# Patient Record
Sex: FEMALE | Race: WHITE | NOT HISPANIC OR LATINO | Employment: FULL TIME | URBAN - METROPOLITAN AREA
[De-identification: names, ages, dates, MRNs, and addresses within clinical notes are randomized per-mention and may not be internally consistent; named-entity substitution may affect disease eponyms.]

---

## 2023-03-11 PROBLEM — Z98.890 POST-OPERATIVE STATE: Status: ACTIVE | Noted: 2023-03-11

## 2023-03-11 PROBLEM — O99.891 BACK PAIN AFFECTING PREGNANCY IN THIRD TRIMESTER (HHS-HCC): Status: ACTIVE | Noted: 2023-03-11

## 2023-03-11 PROBLEM — N64.4 BREAST PAIN: Status: ACTIVE | Noted: 2023-03-11

## 2023-03-11 PROBLEM — T78.40XA ALLERGIC RASH PRESENT ON EXAMINATION: Status: ACTIVE | Noted: 2023-03-11

## 2023-03-11 PROBLEM — D22.9 MULTIPLE NEVI: Status: ACTIVE | Noted: 2023-03-11

## 2023-03-11 PROBLEM — O20.9 BLEEDING IN EARLY PREGNANCY (HHS-HCC): Status: ACTIVE | Noted: 2023-03-11

## 2023-03-11 PROBLEM — K13.79 MOUTH SORES: Status: ACTIVE | Noted: 2023-03-11

## 2023-03-11 PROBLEM — L25.9 DERMATITIS, CONTACT: Status: ACTIVE | Noted: 2023-03-11

## 2023-03-11 PROBLEM — R53.83 FATIGUE: Status: ACTIVE | Noted: 2023-03-11

## 2023-03-11 PROBLEM — M54.50 LOW BACK PAIN: Status: ACTIVE | Noted: 2023-03-11

## 2023-03-11 PROBLEM — R23.3 SPONTANEOUS ECCHYMOSIS: Status: ACTIVE | Noted: 2023-03-11

## 2023-03-11 PROBLEM — M25.552 LEFT HIP PAIN: Status: ACTIVE | Noted: 2023-03-11

## 2023-03-11 PROBLEM — O34.219 DELIVERED BY CESAREAN DELIVERY FOLLOWING PREVIOUS CESAREAN DELIVERY (HHS-HCC): Status: ACTIVE | Noted: 2023-03-11

## 2023-03-11 PROBLEM — E87.8 ELECTROLYTE ABNORMALITY: Status: ACTIVE | Noted: 2023-03-11

## 2023-03-11 PROBLEM — R50.9 FEVER, INTERMITTENT: Status: ACTIVE | Noted: 2023-03-11

## 2023-03-11 PROBLEM — H02.9 EYELID ABNORMALITY: Status: ACTIVE | Noted: 2023-03-11

## 2023-03-11 PROBLEM — N91.2 AMENORRHEA: Status: ACTIVE | Noted: 2023-03-11

## 2023-03-11 PROBLEM — R74.01 ELEVATED ALT MEASUREMENT: Status: ACTIVE | Noted: 2023-03-11

## 2023-03-11 PROBLEM — R05.9 COUGH: Status: ACTIVE | Noted: 2023-03-11

## 2023-03-11 PROBLEM — R63.5 WEIGHT GAIN: Status: ACTIVE | Noted: 2023-03-11

## 2023-03-11 PROBLEM — D84.9: Status: ACTIVE | Noted: 2023-03-11

## 2023-03-11 PROBLEM — Q65.89 ACETABULAR DYSPLASIA (HHS-HCC): Status: ACTIVE | Noted: 2023-03-11

## 2023-03-11 PROBLEM — M54.9 BACK PAIN AFFECTING PREGNANCY IN THIRD TRIMESTER (HHS-HCC): Status: ACTIVE | Noted: 2023-03-11

## 2023-03-11 RX ORDER — BENZONATATE 100 MG/1
1 CAPSULE ORAL 3 TIMES DAILY
COMMUNITY
Start: 2022-07-22 | End: 2023-03-31

## 2023-03-11 RX ORDER — MELOXICAM 15 MG/1
1 TABLET ORAL DAILY
COMMUNITY
Start: 2022-10-10 | End: 2023-03-31 | Stop reason: ALTCHOICE

## 2023-03-15 ENCOUNTER — APPOINTMENT (OUTPATIENT)
Dept: PRIMARY CARE | Facility: CLINIC | Age: 38
End: 2023-03-15
Payer: COMMERCIAL

## 2023-03-16 ENCOUNTER — APPOINTMENT (OUTPATIENT)
Dept: PRIMARY CARE | Facility: CLINIC | Age: 38
End: 2023-03-16
Payer: COMMERCIAL

## 2023-03-31 ENCOUNTER — TELEPHONE (OUTPATIENT)
Dept: PRIMARY CARE | Facility: CLINIC | Age: 38
End: 2023-03-31

## 2023-03-31 ENCOUNTER — OFFICE VISIT (OUTPATIENT)
Dept: PRIMARY CARE | Facility: CLINIC | Age: 38
End: 2023-03-31
Payer: COMMERCIAL

## 2023-03-31 VITALS
RESPIRATION RATE: 12 BRPM | DIASTOLIC BLOOD PRESSURE: 79 MMHG | WEIGHT: 192 LBS | TEMPERATURE: 97.9 F | BODY MASS INDEX: 34.02 KG/M2 | OXYGEN SATURATION: 98 % | SYSTOLIC BLOOD PRESSURE: 122 MMHG | HEART RATE: 86 BPM | HEIGHT: 63 IN

## 2023-03-31 DIAGNOSIS — R05.3 CHRONIC COUGH: Primary | ICD-10-CM

## 2023-03-31 DIAGNOSIS — R05.3 PERSISTENT COUGH: Primary | ICD-10-CM

## 2023-03-31 DIAGNOSIS — R22.1 LUMP IN NECK: ICD-10-CM

## 2023-03-31 PROCEDURE — 1036F TOBACCO NON-USER: CPT | Performed by: FAMILY MEDICINE

## 2023-03-31 PROCEDURE — 99214 OFFICE O/P EST MOD 30 MIN: CPT | Performed by: FAMILY MEDICINE

## 2023-03-31 RX ORDER — METHYLPREDNISOLONE 4 MG/1
TABLET ORAL
Qty: 21 TABLET | Refills: 0 | Status: SHIPPED | OUTPATIENT
Start: 2023-03-31 | End: 2023-04-07

## 2023-03-31 RX ORDER — ASCORBIC ACID 500 MG
500 TABLET ORAL DAILY
COMMUNITY

## 2023-03-31 RX ORDER — FLUTICASONE PROPIONATE 220 UG/1
2 AEROSOL, METERED RESPIRATORY (INHALATION)
Qty: 12 G | Refills: 1 | Status: SHIPPED | OUTPATIENT
Start: 2023-03-31 | End: 2023-04-26 | Stop reason: ALTCHOICE

## 2023-03-31 RX ORDER — BUDESONIDE AND FORMOTEROL FUMARATE DIHYDRATE 160; 4.5 UG/1; UG/1
2 AEROSOL RESPIRATORY (INHALATION)
Qty: 1 EACH | Refills: 1 | Status: SHIPPED | OUTPATIENT
Start: 2023-03-31 | End: 2023-04-26 | Stop reason: SDUPTHER

## 2023-03-31 NOTE — PROGRESS NOTES
"Subjective   Patient ID: Raegan Ozuna is a 37 y.o. female who presents for Cough.    HPI   She has had a persistent cough for 5 weeks. She went to urgent care in the beginning with a sore throat and cough. She tested negative for COVID and Strep. She went back on 03/09 she was given a prednisone taper pack which provided relief while she was on it. The cough returned when the medication ended. She denies fever, congestion,and mucus. She feels well other than the cough. She tried tessalon Perles, cough syrup, cold and flu medications, tea, and humidifier. She has a history of sports induced asthma. She has not needed inhaler since middle school or high school. She only takes OTC vitamins.     She notice a lump on her thyroid 4 weeks ago.  She had two maternal cousins with thyroid cancer. l    Review of Systems    Objective   /79   Pulse 86   Temp 36.6 °C (97.9 °F)   Resp 12   Ht 1.6 m (5' 3\")   Wt 87.1 kg (192 lb)   LMP 03/14/2023   SpO2 98%   BMI 34.01 kg/m²     Physical Exam  Constitutional:       Appearance: Normal appearance.   Neck:      Comments: Pea size nodule R and inferior to thyroid  question lymph nodes  Cardiovascular:      Rate and Rhythm: Normal rate and regular rhythm.      Pulses: Normal pulses.      Heart sounds: Normal heart sounds.   Pulmonary:      Effort: Pulmonary effort is normal.      Breath sounds: Normal breath sounds.   Abdominal:      General: Bowel sounds are normal.      Palpations: Abdomen is soft.   Musculoskeletal:         General: No tenderness. Normal range of motion.      Cervical back: Normal range of motion and neck supple.   Skin:     General: Skin is warm and dry.   Neurological:      General: No focal deficit present.      Mental Status: She is alert and oriented to person, place, and time.   Psychiatric:         Mood and Affect: Mood normal.         Thought Content: Thought content normal.         Judgment: Judgment normal.     Assessment/Plan   Problem List " Items Addressed This Visit          Respiratory    Persistent cough - Primary    Relevant Medications    methylPREDNISolone (Medrol Dospak) 4 mg tablets    budesonide-formoteroL (Symbicort) 160-4.5 mcg/actuation inhaler    Other Relevant Orders    XR chest 2 views (Completed)       Other    Lump in neck       Inflammation induced Asthma  Provided order for Chest X ray. Will call With results.   Start on Symbicort steroid inhaler two puffs in the morning, two puffs in the night, for a month. Rinse mouth out after use. Prescription sent to pharmacy.   May use Albuterol inhaler as needed during coughing episodes.   Start on Medrol Dose pack with food daily. Prescription sent to pharmacy.     Mass Inferior and to the right of Thyroid  Follow up in 3 weeks when inflammation is reduced to reassess.     Follow up in 3 weeks.      Scribe Attestation  By signing my name below, ICherry Scribe   attest that this documentation has been prepared under the direction and in the presence of Cat Reddy DO.

## 2023-03-31 NOTE — PATIENT INSTRUCTIONS
Inflammation induced Asthma  Provided order for Chest X ray. Will call With results.   Start on Symbicort steroid inhaler two puffs in the morning, two puffs in the night, for a month. Rinse mouth out after use. Prescription sent to pharmacy.   May use Albuterol inhaler as needed during coughing episodes.   Start on Medrol Dose pack with food daily. Prescription sent to pharmacy.     Mass Inferior and to the right of Thyroid  Follow up in 3 weeks when inflammation is reduced to reassess.

## 2023-03-31 NOTE — TELEPHONE ENCOUNTER
Pt would like an alternate inhaler due to cost of the one prescribed      Please let her know I sent in generic flovent for her to try  AB

## 2023-04-02 PROBLEM — R22.1 LUMP IN NECK: Status: ACTIVE | Noted: 2023-04-02

## 2023-04-02 PROBLEM — R05.3 PERSISTENT COUGH: Status: ACTIVE | Noted: 2023-04-02

## 2023-04-03 DIAGNOSIS — J18.9 PNEUMONIA DUE TO INFECTIOUS ORGANISM, UNSPECIFIED LATERALITY, UNSPECIFIED PART OF LUNG: Primary | ICD-10-CM

## 2023-04-03 RX ORDER — AZITHROMYCIN 500 MG/1
500 TABLET, FILM COATED ORAL DAILY
Qty: 5 TABLET | Refills: 0 | Status: SHIPPED | OUTPATIENT
Start: 2023-04-03 | End: 2023-04-08

## 2023-04-03 NOTE — PROGRESS NOTES
Pt chest xray shows possible pneumonia.  Will treat with zithromax 500 mg for 5 days.  See back 3 weeks or prn sooner.  Will need repeat cxr or ct if not cleared with treatment

## 2023-04-03 NOTE — TELEPHONE ENCOUNTER
Pt would like to know if you could order her ct that was suggested      I sent message to pt that I would like to treat her for pneumonia and sent in antibiotic and then may repeat cxr and/or do ct chest at that time.  Please make sure she got this message  AB

## 2023-04-26 ENCOUNTER — OFFICE VISIT (OUTPATIENT)
Dept: PRIMARY CARE | Facility: CLINIC | Age: 38
End: 2023-04-26
Payer: COMMERCIAL

## 2023-04-26 ENCOUNTER — LAB (OUTPATIENT)
Dept: LAB | Facility: LAB | Age: 38
End: 2023-04-26
Payer: COMMERCIAL

## 2023-04-26 VITALS
RESPIRATION RATE: 18 BRPM | BODY MASS INDEX: 33.12 KG/M2 | WEIGHT: 194 LBS | SYSTOLIC BLOOD PRESSURE: 128 MMHG | OXYGEN SATURATION: 98 % | HEIGHT: 64 IN | DIASTOLIC BLOOD PRESSURE: 76 MMHG | TEMPERATURE: 98.1 F | HEART RATE: 86 BPM

## 2023-04-26 DIAGNOSIS — F41.9 ANXIETY: ICD-10-CM

## 2023-04-26 DIAGNOSIS — E07.9 LUMP IN THYROID: Primary | ICD-10-CM

## 2023-04-26 DIAGNOSIS — J18.9 PNEUMONIA OF LEFT LUNG DUE TO INFECTIOUS ORGANISM, UNSPECIFIED PART OF LUNG: ICD-10-CM

## 2023-04-26 LAB — THYROTROPIN (MIU/L) IN SER/PLAS BY DETECTION LIMIT <= 0.05 MIU/L: 1.55 MIU/L (ref 0.44–3.98)

## 2023-04-26 PROCEDURE — 99214 OFFICE O/P EST MOD 30 MIN: CPT | Performed by: FAMILY MEDICINE

## 2023-04-26 PROCEDURE — 84443 ASSAY THYROID STIM HORMONE: CPT

## 2023-04-26 PROCEDURE — 1036F TOBACCO NON-USER: CPT | Performed by: FAMILY MEDICINE

## 2023-04-26 PROCEDURE — 36415 COLL VENOUS BLD VENIPUNCTURE: CPT

## 2023-04-26 RX ORDER — ALBUTEROL SULFATE 90 UG/1
AEROSOL, METERED RESPIRATORY (INHALATION)
COMMUNITY
Start: 2023-03-09 | End: 2023-04-26 | Stop reason: ALTCHOICE

## 2023-04-26 RX ORDER — ACETAMINOPHEN 325 MG/1
TABLET ORAL
COMMUNITY
Start: 2022-07-21 | End: 2023-04-26 | Stop reason: ALTCHOICE

## 2023-04-26 RX ORDER — PREDNISONE 20 MG/1
TABLET ORAL
COMMUNITY
Start: 2023-03-09 | End: 2023-04-26 | Stop reason: ALTCHOICE

## 2023-04-26 RX ORDER — GENTAMICIN SULFATE 3 MG/ML
SOLUTION/ DROPS OPHTHALMIC
COMMUNITY
Start: 2023-03-17 | End: 2023-04-26 | Stop reason: ALTCHOICE

## 2023-04-26 ASSESSMENT — PATIENT HEALTH QUESTIONNAIRE - PHQ9
2. FEELING DOWN, DEPRESSED OR HOPELESS: NOT AT ALL
SUM OF ALL RESPONSES TO PHQ9 QUESTIONS 1 AND 2: 0
1. LITTLE INTEREST OR PLEASURE IN DOING THINGS: NOT AT ALL

## 2023-04-26 ASSESSMENT — PAIN SCALES - GENERAL: PAINLEVEL: 0-NO PAIN

## 2023-04-26 NOTE — PROGRESS NOTES
"Subjective   Patient ID: Raegan Ozuna is a 37 y.o. female who presents for f/up from sick visit.    HPI   She reports she is pregnant. Her LMP started 03/14/2023. She has an OB appointment on 05/01/2023. Dr Tilley     She notes that her cough is gone. She previously had a chest x ray that showed developing pneumonia. She is recommended to have a repeat X Ray. Will do in 6-8 weeks      She is concerned about her Thyroid, as she feels a lump on her right side. She denies pain or difficulty swallowing. She notes that she voiced this at last visit, but waited until cough and URI were resolved and it is unchanged. She has had imaging of her neck done in the past due to swollen lymphnodes 7/2022 when had covid. She has not  had imaging of her thyroid. She reports she had two maternal cousins who had thyroid cancer at ages 28 and 31. She denies family history of hypothyroidism.     She inquired about medication for anxiety that is safe for pregnancy. She had been on medication in the past, and felt palpitations, but she attributes this to a health drink she was using at the time and stayed on lexapro less than 30 days.  She notes the anxiety has been present since she had her second son and she has just been dealing with it but feels like she needs to start something.  Will discuss with OB at May 1st appt and then may start med, discussed starting zoloft    Review of Systems    Objective   /76   Pulse 86   Temp 36.7 °C (98.1 °F)   Resp 18   Ht 1.613 m (5' 3.5\")   Wt 88 kg (194 lb)   LMP 03/14/2023   SpO2 98%   BMI 33.83 kg/m²     Physical Exam  Constitutional:       Appearance: Normal appearance.   Neck:      Comments: Question lump lower R thyroid.  Nontender, not discreet  Cardiovascular:      Rate and Rhythm: Normal rate and regular rhythm.      Pulses: Normal pulses.      Heart sounds: Normal heart sounds.   Pulmonary:      Effort: Pulmonary effort is normal.      Breath sounds: Normal breath sounds. "   Musculoskeletal:         General: No tenderness. Normal range of motion.      Cervical back: Normal range of motion and neck supple.   Skin:     General: Skin is warm and dry.   Neurological:      Mental Status: She is alert and oriented to person, place, and time.   Psychiatric:         Mood and Affect: Mood normal.         Thought Content: Thought content normal.         Judgment: Judgment normal.         Assessment/Plan   Problem List Items Addressed This Visit    None  Visit Diagnoses       Lump in thyroid    -  Primary    Relevant Orders    US thyroid    Pneumonia of left lung due to infectious organism, unspecified part of lung        Relevant Orders    XR chest 2 views    Anxiety        Relevant Orders    TSH with reflex to Free T4 if abnormal          Thyroid mass on right side  Provided order for Ultrasound of the Thyroid. Will call with results.     Inflammation induced Asthma Follow up  Will repeat Chest X Ray at end of may, once in the second trimester.   Provided order. Will call with results.     Anxiety   Advised to discuss with OB/GYN   Provided order for repeat TSH levels. Will call with results.  May try Zoloft if ok w OB, pt will call and let me know, discussed starting med and side effect profile and then will see her back in 3-4 weeks if start medication    Follow up in 1 month after OB appointment.     Scribe Attestation  By signing my name below, ICherry , Scribe   attest that this documentation has been prepared under the direction and in the presence of Cat Reddy DO.

## 2023-04-26 NOTE — PATIENT INSTRUCTIONS
Thyroid mass on right side  Provided order for Ultrasound of the Thyroid. Will call with results.     Inflammation induced Asthma Follow up  Will repeat Chest X Ray at end of may, once in the second trimester.   Provided order. Will call with results.     Anxiety   Advised to discuss with OB/GYN   Provided order for repeat TSH levels. Will call with results.

## 2023-05-02 LAB
CHLAMYDIA TRACH., AMPLIFIED: NEGATIVE
N. GONORRHEA, AMPLIFIED: NEGATIVE
URINE CULTURE: NORMAL

## 2023-05-03 ENCOUNTER — APPOINTMENT (OUTPATIENT)
Dept: LAB | Facility: LAB | Age: 38
End: 2023-05-03
Payer: COMMERCIAL

## 2023-05-03 LAB
ABO GROUP (TYPE) IN BLOOD: NORMAL
ANTIBODY SCREEN: NORMAL
CALCIDIOL (25 OH VITAMIN D3) (NG/ML) IN SER/PLAS: 23 NG/ML
ERYTHROCYTE DISTRIBUTION WIDTH (RATIO) BY AUTOMATED COUNT: 12.4 % (ref 11.5–14.5)
ERYTHROCYTE MEAN CORPUSCULAR HEMOGLOBIN CONCENTRATION (G/DL) BY AUTOMATED: 33.2 G/DL (ref 32–36)
ERYTHROCYTE MEAN CORPUSCULAR VOLUME (FL) BY AUTOMATED COUNT: 87 FL (ref 80–100)
ERYTHROCYTES (10*6/UL) IN BLOOD BY AUTOMATED COUNT: 4.54 X10E12/L (ref 4–5.2)
HEMATOCRIT (%) IN BLOOD BY AUTOMATED COUNT: 39.7 % (ref 36–46)
HEMOGLOBIN (G/DL) IN BLOOD: 13.2 G/DL (ref 12–16)
HEPATITIS B VIRUS SURFACE AG PRESENCE IN SERUM: NONREACTIVE
HEPATITIS C VIRUS AB PRESENCE IN SERUM: NONREACTIVE
HIV 1/ 2 AG/AB SCREEN: NONREACTIVE
LEUKOCYTES (10*3/UL) IN BLOOD BY AUTOMATED COUNT: 9.2 X10E9/L (ref 4.4–11.3)
NRBC (PER 100 WBCS) BY AUTOMATED COUNT: 0 /100 WBC (ref 0–0)
PLATELETS (10*3/UL) IN BLOOD AUTOMATED COUNT: 235 X10E9/L (ref 150–450)
REFLEX ADDED, ANEMIA PANEL: NORMAL
RH FACTOR: NORMAL
RUBELLA VIRUS IGG AB: NORMAL
SYPHILIS TOTAL AB: NONREACTIVE

## 2023-05-04 LAB
HEMOGLOBIN A2: 2.4 %
HEMOGLOBIN A: 96.8 %
HEMOGLOBIN F: 0.8 %
HEMOGLOBIN IDENTIFICATION INTERPRETATION: NORMAL
PATH REVIEW-HGB IDENTIFICATION: NORMAL

## 2023-06-05 ASSESSMENT — EDINBURGH POSTNATAL DEPRESSION SCALE (EPDS)
I HAVE BEEN ANXIOUS OR WORRIED FOR NO GOOD REASON: HARDLY EVER
I HAVE FELT SCARED OR PANICKY FOR NO GOOD REASON: NO, NOT AT ALL
I HAVE LOOKED FORWARD WITH ENJOYMENT TO THINGS: AS MUCH AS I EVER DID
I HAVE BLAMED MYSELF UNNECESSARILY WHEN THINGS WENT WRONG: NOT VERY OFTEN
THINGS HAVE BEEN GETTING ON TOP OF ME: NO, MOST OF THE TIME I HAVE COPED QUITE WELL
THE THOUGHT OF HARMING MYSELF HAS OCCURRED TO ME: NEVER
I HAVE FELT SAD OR MISERABLE: NO, NOT AT ALL
I HAVE BEEN SO UNHAPPY THAT I HAVE BEEN CRYING: NO, NEVER
I HAVE BEEN ABLE TO LAUGH AND SEE THE FUNNY SIDE OF THINGS: AS MUCH AS I ALWAYS COULD
TOTAL SCORE: 3
I HAVE BEEN SO UNHAPPY THAT I HAVE HAD DIFFICULTY SLEEPING: NOT AT ALL

## 2023-06-07 ENCOUNTER — APPOINTMENT (OUTPATIENT)
Dept: LAB | Facility: LAB | Age: 38
End: 2023-06-07
Payer: COMMERCIAL

## 2023-07-05 LAB — URINE CULTURE: ABNORMAL

## 2023-07-26 ENCOUNTER — PATIENT OUTREACH (OUTPATIENT)
Dept: CARE COORDINATION | Facility: CLINIC | Age: 38
End: 2023-07-26
Payer: COMMERCIAL

## 2023-08-03 ENCOUNTER — DOCUMENTATION (OUTPATIENT)
Dept: CARE COORDINATION | Facility: CLINIC | Age: 38
End: 2023-08-03
Payer: COMMERCIAL

## 2023-09-26 ENCOUNTER — LAB (OUTPATIENT)
Dept: LAB | Facility: LAB | Age: 38
End: 2023-09-26
Payer: COMMERCIAL

## 2023-09-26 LAB
ERYTHROCYTE DISTRIBUTION WIDTH (RATIO) BY AUTOMATED COUNT: 13.2 % (ref 11.5–14.5)
ERYTHROCYTE MEAN CORPUSCULAR HEMOGLOBIN CONCENTRATION (G/DL) BY AUTOMATED: 31.5 G/DL (ref 32–36)
ERYTHROCYTE MEAN CORPUSCULAR VOLUME (FL) BY AUTOMATED COUNT: 92 FL (ref 80–100)
ERYTHROCYTES (10*6/UL) IN BLOOD BY AUTOMATED COUNT: 4 X10E12/L (ref 4–5.2)
GLUCOSE, 1 HR SCREEN, PREG: 138 MG/DL
HEMATOCRIT (%) IN BLOOD BY AUTOMATED COUNT: 36.8 % (ref 36–46)
HEMOGLOBIN (G/DL) IN BLOOD: 11.6 G/DL (ref 12–16)
LEUKOCYTES (10*3/UL) IN BLOOD BY AUTOMATED COUNT: 9.3 X10E9/L (ref 4.4–11.3)
NRBC (PER 100 WBCS) BY AUTOMATED COUNT: 0 /100 WBC (ref 0–0)
PLATELETS (10*3/UL) IN BLOOD AUTOMATED COUNT: 229 X10E9/L (ref 150–450)
REFLEX ADDED, ANEMIA PANEL: ABNORMAL
SYPHILIS TOTAL AB: NONREACTIVE

## 2023-10-02 ENCOUNTER — ROUTINE PRENATAL (OUTPATIENT)
Dept: OBSTETRICS AND GYNECOLOGY | Facility: CLINIC | Age: 38
End: 2023-10-02
Payer: COMMERCIAL

## 2023-10-02 VITALS — WEIGHT: 217 LBS | BODY MASS INDEX: 38.44 KG/M2 | DIASTOLIC BLOOD PRESSURE: 72 MMHG | SYSTOLIC BLOOD PRESSURE: 118 MMHG

## 2023-10-02 DIAGNOSIS — O24.419 GESTATIONAL DIABETES MELLITUS (GDM) IN THIRD TRIMESTER, GESTATIONAL DIABETES METHOD OF CONTROL UNSPECIFIED (HHS-HCC): Primary | ICD-10-CM

## 2023-10-02 DIAGNOSIS — O09.33 INSUFFICIENT PRENATAL CARE IN THIRD TRIMESTER (HHS-HCC): ICD-10-CM

## 2023-10-02 LAB
POC GLUCOSE, URINE: NEGATIVE MG/DL
POC PROTEIN, URINE: NEGATIVE MG/DL

## 2023-10-02 PROCEDURE — 99213 OFFICE O/P EST LOW 20 MIN: CPT | Performed by: OBSTETRICS & GYNECOLOGY

## 2023-10-02 PROCEDURE — 81003 URINALYSIS AUTO W/O SCOPE: CPT | Mod: QW | Performed by: OBSTETRICS & GYNECOLOGY

## 2023-10-02 PROCEDURE — 59425 ANTEPARTUM CARE ONLY: CPT | Performed by: OBSTETRICS & GYNECOLOGY

## 2023-10-02 NOTE — PROGRESS NOTES
"Subjective   Patient ID [id number]   Raegan Ozuna is a 37 y.o.  at 28w6d with a working estimated date of delivery of 2023, by Last Menstrual Period who presents for a routine prenatal visit. She denies vaginal bleeding, leakage of fluid, decreased fetal movements, or contractions.    Her pregnancy is complicated by:  Previous  section x2      Objective   Physical Exam:   Weight: 98.4 kg (217 lb)  Expected Total Weight Gain: Could not be calculated   Pregravid BMI: Could not be calculated  BP: 118/72         Size greater than dates at 33 cm.  Good fetal movement.    Prenatal Labs  Urine Dip:  Lab Results   Component Value Date    KETONESU NEGATIVE 2023     Lab Results   Component Value Date    HGB 11.6 (L) 2023    HCT 36.8 2023    HEPBSAG NONREACTIVE 2023     No results found for: \"PAPPA\", \"AFP\", \"HCG\", \"ESTRIOL\", \"INHBA\"  No results found for: \"GLUF\", \"GLUT1\", \"BRBCGJW4RB\", \"DLIASDP3ZX\"    Imaging    Assessment/Plan     Continue prenatal vitamin.  Labs reviewed.  Expected mode of delivery plans for repeat  section  Follow up in 2 week for a routine prenatal visit.  "

## 2023-10-05 ENCOUNTER — LAB (OUTPATIENT)
Dept: LAB | Facility: LAB | Age: 38
End: 2023-10-05
Payer: COMMERCIAL

## 2023-10-05 DIAGNOSIS — R73.9 HYPERGLYCEMIA, UNSPECIFIED: Primary | ICD-10-CM

## 2023-10-05 LAB
GLUCOSE 1H P 100 G GLC PO SERPL-MCNC: 192 MG/DL
GLUCOSE 2H P 100 G GLC PO SERPL-MCNC: 161 MG/DL
GLUCOSE 3H P 100 G GLC PO SERPL-MCNC: 99 MG/DL
GLUCOSE P FAST SERPL-MCNC: 80 MG/DL

## 2023-10-05 PROCEDURE — 36415 COLL VENOUS BLD VENIPUNCTURE: CPT

## 2023-10-05 PROCEDURE — 82952 GTT-ADDED SAMPLES: CPT

## 2023-10-05 PROCEDURE — 82950 GLUCOSE TEST: CPT

## 2023-10-05 PROCEDURE — 82947 ASSAY GLUCOSE BLOOD QUANT: CPT

## 2023-10-06 DIAGNOSIS — O24.410 DIET CONTROLLED GESTATIONAL DIABETES MELLITUS (GDM) IN THIRD TRIMESTER (HHS-HCC): Primary | ICD-10-CM

## 2023-10-06 RX ORDER — LANCETS
EACH MISCELLANEOUS
Qty: 100 EACH | Refills: 3 | Status: SHIPPED | OUTPATIENT
Start: 2023-10-06

## 2023-10-06 RX ORDER — DEXTROSE 4 G
TABLET,CHEWABLE ORAL
Qty: 1 EACH | Refills: 0 | Status: SHIPPED | OUTPATIENT
Start: 2023-10-06

## 2023-10-11 ENCOUNTER — EDUCATION (OUTPATIENT)
Dept: MATERNAL FETAL MEDICINE | Facility: CLINIC | Age: 38
End: 2023-10-11
Payer: COMMERCIAL

## 2023-10-12 NOTE — PROGRESS NOTES
Gestational Diabetes Self-Management VIRTUAL Group Education provided today,     Overview of Diabetes    Type 1    Type 2    Gestational       -Risks to baby and Mom  Self-monitoring     Tips for Testing/troubleshooting glucometers    Goal range for blood sugars (<95 fasting, <140 1 hour postprandial for all meals)    Record Keeping    Blood Sugar Line    Blood Sugar Log Sheets - provided  Managing Diabetes     Physical Activity - recommendation is about 150 minutes per week    Medication        Oral/insulin overview  Additional  testing     NST/BPP/Growth ultrasound - general overview  Postpartum     Expectations in the hospital    Follow up - recommend fasting, 75g OGGT, 6-8 weeks after delivery     50% change of developing GDM in another pregnancy    50% chance of developing T2DM within next 10 years    Up to 30% of patients will have pre-diabetes or T2DM following delivery       Breastfeeding is strongly encouraged   Special considerations, self-management    Hypoglycemia    Hyperglycemia    Sick Day Rules  Resilience training/stress management    Engage your senses    Gratitude practice  Emotional support     “Baby Blues”    Postpartum Depression       When to call for help  Nutrition planning     Identify carbohydrates, serving size, carbohydrate counting    “Free Foods” - very low carbohydrate options    Label Reading    Personalized Meal Plan     3 meals + 3 snacks = approximately 175g carbohydrates/day    Follow up for 1:1 Registered Dietician consult       [phone] to schedule appointment    Individual consult with Maternal Fetal Medicine provider is scheduled.

## 2023-10-15 PROBLEM — O24.419 GESTATIONAL DIABETES MELLITUS (GDM) IN THIRD TRIMESTER (HHS-HCC): Status: ACTIVE | Noted: 2023-10-15

## 2023-10-15 PROBLEM — N85.A UTERINE SCAR FROM PREVIOUS CESAREAN DELIVERY: Status: ACTIVE | Noted: 2023-10-15

## 2023-10-15 PROBLEM — N91.2 AMENORRHEA: Status: RESOLVED | Noted: 2023-03-11 | Resolved: 2023-10-15

## 2023-10-15 PROBLEM — R05.9 COUGH: Status: RESOLVED | Noted: 2023-03-11 | Resolved: 2023-10-15

## 2023-10-15 PROBLEM — O99.210 OBESITY IN PREGNANCY (HHS-HCC): Status: ACTIVE | Noted: 2023-10-15

## 2023-10-15 NOTE — PROGRESS NOTES
Raegan Ozuna is a 37 y.o. here for MFM consultation at 30w5d for GDM, referred by Dr. Tilley    Overall pt reports, she is feeling well. +FM. Denies VB, LOF, or CTXs.     Bootcamp last week. Checking BG 4x daily. BG log reviewed- more than 80% within goal range.      issues:  GDM  Obesity     ObHx-, TAB , then miscarriage followed by ectopic pregnancy ) delivered via  x2, this pregnancy complicated by sepsis and complicated UTI  GynHx-regular periods, last PAP , remote h/x abnormal PAP () and had colpo  MedHx-? Autoimmune disorder- has not seen immunologist in a while   SurgHx-tonsilectomy, L elbow, c/s x2 as above   FamHx-denies pertinent h/x  SocHx-denies ETOH, tobacco, or illicit drugs  All-NKDA  Meds-Macrobid daily, PNV    Gen-NAD  Cardiac- good peripheral perfusion  Respiratory- non-labored breathing  Abdomen- soft, non-tender, gravid   Extremities- symmetrical   Pelvic- deferred      Visit Vitals  /70   Wt 96.6 kg (213 lb)   LMP 2023   BMI 37.73 kg/m²   OB Status Pregnant   Smoking Status Never   BSA 2.07 m²        Sono-EFW 96% , AC >99%     Problem List Items Addressed This Visit          Pregnancy    Gestational diabetes mellitus (GDM) in third trimester    Overview     -Shared Care with Dr. Tilley  -1hr-138 (), 3hr (10/5)- fasting 80, 1hr 192, 2hr 161, 3hr 99  - Attended [place]  - M Consult completed 10/16  -Weekly communication with blood sugar line  -MFM 36 wk visit  -Serial growth ultrasounds starting at 28 weeks    Last ultrasound: EFW 96%, AC >99%, BPP 8/8, normal MANUEL    Fetal surveillance if on medications, LGA, poly, or uncontrolled/level of control unknown:  -Weekly at 32 weeks  -Twice weekly at 36 weeks    Current Regimen: nutrition    Delivery Plan: pending 36 week follow up visit  Intrapartum:  GDM protocol  Postpartum: No medication    Recommend PP 2hr gtt and q3yr F/U with PCP for A1C and TSH           Current Assessment &  Plan     Patient was recently diagnosed with gestational diabetes (GDM).  We discussed the pregnancy implications of the diagnosis including increased risk of pre-eclampsia, LGA/macrosomia, shoulder dystocia, stillbirth as well as  hypoglycemia, hyperbilirubinemia and respiratory distress.  We reviewed the importance of glycemic control and its impact on lowering these risks.  Discussed management ranging from dietary changes to pharmacotherapy and that insulin is considered first line therapy rather than oral agents if medication is ultimately needed.  Discussed our recommendation for serial growth ultrasounds and starting  testing at 32 weeks if treatment is required.  We also stressed the potential persistence of impaired glucose tolerance post pregnancy in up to 30% of patients and 50% risk of IGT/T2DM in the next 10 years with an overall lifetime risk of T2DM of 70%. We reviewed the recommendation for postpartum screening at 6 weeks post-partum (can be performed as soon as 2 days after delivery) and, if normal, routine screening every 1-3 years. We did discuss that a healthy diet and maintenance of a normal body weight may reduce those risks    In summary the following is recommended:    1. We will continue to co-manage diabetes via the Bloodsugar line with weekly assessment of glycemic control.  Current regimen is: nutrition plan, reviewed BG log in office, one outlier for fasting levels. Advised to send weekly to blood sugar line on   2. We will plan for a follow up [facility] visit at 36 weeks to assess overall control and provide delivery timing recommendations.  3. Recommend serial growth ultrasounds every 4 weeks starting at 28 weeks gestation.  4. Weekly  testing is recommended starting at 32 weeks IF medication is required OR there is a LGA growth pattern.  Twice weekly testing is recommended at 32 weeks if control is suboptimal, there is polyhydramnios, or medication is  required AND there is a LGA growth pattern.  5. Delivery is recommended at 39 weeks, though if glycemic control is suboptimal then delivery at 37-39 weeks may be considered.  6. If the EFW is >4500g at the time of delivery  should be considered.  7. A 2hr GTT is recommended 6 weeks postpartum (can be performed as soon as 2 days postpartum), if normal then screening for T2DM is recommended at least every 3 years.           Obesity in pregnancy - Primary    Overview     -Today we discussed the pregnancy implications including increased risk of pre-eclampsia, gestational diabetes,  delivery, LGA/macrosomia, growth restriction, stillbirth, shoulder dystocia, venous thromboembolic disease and congenital anomalies.    -We reviewed that modest weight loss of even 5% of total body weight is associated with a decrease in these risks.  We also discussed that preconception BMI appears to more predictive of adverse pregnancy outcome than gestational weight gain and that it is unclear what is optimal weight gain in pregnancy in women with obesity, though weight loss is discouraged during pregnancy.    -We reviewed the [place] of Medicine recommendations for gestational weight gain obese women, though subsequent large studies have suggestive that lower weight gain may be associated with lower rates of maternal and fetal morbidity and that we recommend that she limit her weight gain to no more than 11-20lbs during pregnancy and that weight gain less than 11-20 lbs is also acceptable assuming normal fetal growth.    - testing weekly at 37 wks if not initiated sooner   -See GDM dx              Uterine scar from previous  delivery    Overview     -CD x2  -Plans rCD   -Deciding on delivery [facility], [place] [facility]  -Delivery planning visit for GDM at or around 36 wks         Urinary tract infection in mother during third trimester of pregnancy    Overview     -Hospitalized in early  pregnancy and treated for sepsis with readmission a few days later as symptoms returned, GBS + urine, ? Pyelonephritis  -On ppx macrobid daily now  -Treat for GBS + at time of delivery          Supervision of high risk pregnancy in third trimester    Overview     -Shared care with Dr. Tilley, but considering ROXANNA   -Oriented to practice and care model in case she decides to transfer.   -Plan to RTC in 2 wks if ROXANNA or at 36 wks if she continues in shared care model  -Weekly communication with blood sugar line  -Repeat growth x3 wks given LGA status            Excessive fetal growth affecting management of pregnancy in third trimester    Overview     -Reviewed that a fetus 4000 to 4500g is considered macrosomic.   -Macrosomia is associated with increased risk of complications especially maternal or fetal birth trauma as well as fetal hypoglycemia and respiratory distress.   -Maternal birth trauma may include arrested labor, assisted vaginal delivery,  birth, genital tract lacerations, PPH, or uterine rupture   -Fetal birth trauma may include shoulder dystocia (which in turn can lead to brachial plexus injury/fracture or asphyxia) and stillbirth.  concerns following delivery may require NICU admission for hypoglycemia, respiratory distress, polycythemia, as well as prolonged admission in the NICU   -Discussed we expect average weight gain of about 300g per week.    -Already planning for rCD                RTC for MFM F/U at 36 wks or in 2 wks if pt desires ROXANNA   Weekly communication with blood sugar line- nutrition plan currently    MADISON Porras-CNP

## 2023-10-15 NOTE — ASSESSMENT & PLAN NOTE
Patient was recently diagnosed with gestational diabetes (GDM).  We discussed the pregnancy implications of the diagnosis including increased risk of pre-eclampsia, LGA/macrosomia, shoulder dystocia, stillbirth as well as  hypoglycemia, hyperbilirubinemia and respiratory distress.  We reviewed the importance of glycemic control and its impact on lowering these risks.  Discussed management ranging from dietary changes to pharmacotherapy and that insulin is considered first line therapy rather than oral agents if medication is ultimately needed.  Discussed our recommendation for serial growth ultrasounds and starting  testing at 32 weeks if treatment is required.  We also stressed the potential persistence of impaired glucose tolerance post pregnancy in up to 30% of patients and 50% risk of IGT/T2DM in the next 10 years with an overall lifetime risk of T2DM of 70%. We reviewed the recommendation for postpartum screening at 6 weeks post-partum (can be performed as soon as 2 days after delivery) and, if normal, routine screening every 1-3 years. We did discuss that a healthy diet and maintenance of a normal body weight may reduce those risks    In summary the following is recommended:    1. We will continue to co-manage diabetes via the Bloodsugar line with weekly assessment of glycemic control.  Current regimen is: nutrition plan, reviewed BG log in office, one outlier for fasting levels. Advised to send weekly to blood sugar line on   2. We will plan for a follow up [facility] visit at 36 weeks to assess overall control and provide delivery timing recommendations.  3. Recommend serial growth ultrasounds every 4 weeks starting at 28 weeks gestation.  4. Weekly  testing is recommended starting at 32 weeks IF medication is required OR there is a LGA growth pattern.  Twice weekly testing is recommended at 32 weeks if control is suboptimal, there is polyhydramnios, or medication is required AND  there is a LGA growth pattern.  5. Delivery is recommended at 39 weeks, though if glycemic control is suboptimal then delivery at 37-39 weeks may be considered.  6. If the EFW is >4500g at the time of delivery  should be considered.  7. A 2hr GTT is recommended 6 weeks postpartum (can be performed as soon as 2 days postpartum), if normal then screening for T2DM is recommended at least every 3 years.

## 2023-10-16 ENCOUNTER — INITIAL PRENATAL (OUTPATIENT)
Dept: MATERNAL FETAL MEDICINE | Facility: HOSPITAL | Age: 38
End: 2023-10-16
Payer: COMMERCIAL

## 2023-10-16 ENCOUNTER — HOSPITAL ENCOUNTER (OUTPATIENT)
Dept: RADIOLOGY | Facility: HOSPITAL | Age: 38
Discharge: HOME | End: 2023-10-16
Payer: COMMERCIAL

## 2023-10-16 VITALS — DIASTOLIC BLOOD PRESSURE: 70 MMHG | WEIGHT: 213 LBS | BODY MASS INDEX: 37.73 KG/M2 | SYSTOLIC BLOOD PRESSURE: 110 MMHG

## 2023-10-16 DIAGNOSIS — O24.419 GESTATIONAL DIABETES MELLITUS (GDM) IN THIRD TRIMESTER, GESTATIONAL DIABETES METHOD OF CONTROL UNSPECIFIED (HHS-HCC): ICD-10-CM

## 2023-10-16 DIAGNOSIS — O09.93 SUPERVISION OF HIGH RISK PREGNANCY IN THIRD TRIMESTER (HHS-HCC): ICD-10-CM

## 2023-10-16 DIAGNOSIS — O36.63X0 EXCESSIVE FETAL GROWTH AFFECTING MANAGEMENT OF PREGNANCY IN THIRD TRIMESTER, SINGLE OR UNSPECIFIED FETUS (HHS-HCC): ICD-10-CM

## 2023-10-16 DIAGNOSIS — O99.210 OBESITY IN PREGNANCY (HHS-HCC): Primary | ICD-10-CM

## 2023-10-16 DIAGNOSIS — O23.43 URINARY TRACT INFECTION IN MOTHER DURING THIRD TRIMESTER OF PREGNANCY (HHS-HCC): ICD-10-CM

## 2023-10-16 DIAGNOSIS — N85.A UTERINE SCAR FROM PREVIOUS CESAREAN DELIVERY: ICD-10-CM

## 2023-10-16 PROCEDURE — 76816 OB US FOLLOW-UP PER FETUS: CPT

## 2023-10-16 PROCEDURE — 76819 FETAL BIOPHYS PROFIL W/O NST: CPT | Performed by: STUDENT IN AN ORGANIZED HEALTH CARE EDUCATION/TRAINING PROGRAM

## 2023-10-16 PROCEDURE — 99215 OFFICE O/P EST HI 40 MIN: CPT | Performed by: NURSE PRACTITIONER

## 2023-10-16 PROCEDURE — 76816 OB US FOLLOW-UP PER FETUS: CPT | Performed by: STUDENT IN AN ORGANIZED HEALTH CARE EDUCATION/TRAINING PROGRAM

## 2023-10-16 PROCEDURE — 76819 FETAL BIOPHYS PROFIL W/O NST: CPT

## 2023-10-16 ASSESSMENT — ENCOUNTER SYMPTOMS
TREMORS: 0
POLYDIPSIA: 0
SWEATS: 0
NERVOUS/ANXIOUS: 0
SPEECH DIFFICULTY: 0
BLACKOUTS: 0
SEIZURES: 0
POLYPHAGIA: 0
DIZZINESS: 0
VISUAL CHANGE: 0
WEAKNESS: 0
HUNGER: 0
HEADACHES: 0
CONFUSION: 0
WEIGHT LOSS: 0
BLURRED VISION: 0
FATIGUE: 0

## 2023-10-23 ENCOUNTER — PHARMACY VISIT (OUTPATIENT)
Dept: PHARMACY | Facility: CLINIC | Age: 38
End: 2023-10-23
Payer: COMMERCIAL

## 2023-10-23 ENCOUNTER — TELEPHONE (OUTPATIENT)
Dept: MATERNAL FETAL MEDICINE | Facility: HOSPITAL | Age: 38
End: 2023-10-23
Payer: COMMERCIAL

## 2023-10-23 ENCOUNTER — APPOINTMENT (OUTPATIENT)
Dept: OBSTETRICS AND GYNECOLOGY | Facility: CLINIC | Age: 38
End: 2023-10-23
Payer: COMMERCIAL

## 2023-10-23 DIAGNOSIS — O24.414 INSULIN CONTROLLED GESTATIONAL DIABETES MELLITUS (GDM) IN THIRD TRIMESTER (HHS-HCC): Primary | ICD-10-CM

## 2023-10-23 PROCEDURE — RXMED WILLOW AMBULATORY MEDICATION CHARGE

## 2023-10-23 RX ORDER — PEN NEEDLE, DIABETIC 30 GX3/16"
NEEDLE, DISPOSABLE MISCELLANEOUS
Qty: 100 EACH | Refills: 11 | Status: SHIPPED | OUTPATIENT
Start: 2023-10-23

## 2023-10-23 RX ORDER — ISOPROPYL ALCOHOL 70 ML/100ML
1 SWAB TOPICAL 4 TIMES DAILY
Qty: 100 EACH | Refills: 11 | Status: SHIPPED | OUTPATIENT
Start: 2023-10-23 | End: 2024-10-22

## 2023-10-23 RX ORDER — HUMAN INSULIN 100 [IU]/ML
10 INJECTION, SUSPENSION SUBCUTANEOUS NIGHTLY
Qty: 15 ML | Refills: 3 | Status: SHIPPED | OUTPATIENT
Start: 2023-10-23 | End: 2023-12-10 | Stop reason: HOSPADM

## 2023-10-23 NOTE — TELEPHONE ENCOUNTER
"Blood Sugar Support Line Communication   Communicated with the patient on 10/23/2023   She has Gestational Diabetes @ 31w6d     At the time of the call her diabetes was treated with:  Nurtrtion plan alone     The patient checks her sugars fasting and 1 hour after meals, and reports them as follows:  Fasting   .  5/7   levels above goal    The remainder of the blood glucose values are 80% within goal range. 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  Education via Zoom set to tomarow.    Patient understands to submit sugar log for review weekly through the Blood Sugar Line @ [phone] or via email to [email or web address] to help optimize glucose control.    I spent approximately 20+ minutes on the phone with the patient       "

## 2023-10-24 ENCOUNTER — EDUCATION (OUTPATIENT)
Dept: MATERNAL FETAL MEDICINE | Facility: HOSPITAL | Age: 38
End: 2023-10-24
Payer: COMMERCIAL

## 2023-10-24 NOTE — PROGRESS NOTES
An interactive audio and video telecommunication system which permits real time communications between the patient and provider  was utilized to provide this telehealth service. Verbal consent was requested and obtained      Insulin Pen Education   Recommended initial med     Able to demonstrate/describe:     Wash hands      Check label - verify correct medication   Gentle mix (NPH)   Attach new needle each time   Safety test (prime pen, ensure needle working properly)    Select correct dose   Self injection - differed. states able to do this evening, no concerns   Site selection & rotation   Remove needle   Insulin Storage   Needle disposal    Review Hypoglycemia    Causes   Sign & symptoms   Oral Treatment    Patient appears to have good understanding and is engaged in self-care.  Encouraged to call for questions or concerns    Patient scheduled follow up Monday    Plans f/u  weekly for BGM support.    Has  log sheets and contact information.

## 2023-10-30 ENCOUNTER — APPOINTMENT (OUTPATIENT)
Dept: OBSTETRICS AND GYNECOLOGY | Facility: HOSPITAL | Age: 38
End: 2023-10-30
Payer: COMMERCIAL

## 2023-10-30 ENCOUNTER — OFFICE VISIT (OUTPATIENT)
Dept: MATERNAL FETAL MEDICINE | Facility: HOSPITAL | Age: 38
End: 2023-10-30
Payer: COMMERCIAL

## 2023-10-30 VITALS — BODY MASS INDEX: 38.26 KG/M2 | SYSTOLIC BLOOD PRESSURE: 104 MMHG | WEIGHT: 216 LBS | DIASTOLIC BLOOD PRESSURE: 69 MMHG

## 2023-10-30 DIAGNOSIS — O23.43 URINARY TRACT INFECTION IN MOTHER DURING THIRD TRIMESTER OF PREGNANCY (HHS-HCC): ICD-10-CM

## 2023-10-30 DIAGNOSIS — O24.419 GESTATIONAL DIABETES MELLITUS (GDM) IN THIRD TRIMESTER, GESTATIONAL DIABETES METHOD OF CONTROL UNSPECIFIED (HHS-HCC): Primary | ICD-10-CM

## 2023-10-30 DIAGNOSIS — O09.93 SUPERVISION OF HIGH RISK PREGNANCY IN THIRD TRIMESTER (HHS-HCC): ICD-10-CM

## 2023-10-30 DIAGNOSIS — O99.210 OBESITY IN PREGNANCY (HHS-HCC): ICD-10-CM

## 2023-10-30 DIAGNOSIS — N85.A UTERINE SCAR FROM PREVIOUS CESAREAN DELIVERY: ICD-10-CM

## 2023-10-30 DIAGNOSIS — O36.63X0 EXCESSIVE FETAL GROWTH AFFECTING MANAGEMENT OF PREGNANCY IN THIRD TRIMESTER, SINGLE OR UNSPECIFIED FETUS (HHS-HCC): ICD-10-CM

## 2023-10-30 PROCEDURE — 1036F TOBACCO NON-USER: CPT | Performed by: NURSE PRACTITIONER

## 2023-10-30 PROCEDURE — 3078F DIAST BP <80 MM HG: CPT | Performed by: NURSE PRACTITIONER

## 2023-10-30 PROCEDURE — 3074F SYST BP LT 130 MM HG: CPT | Performed by: NURSE PRACTITIONER

## 2023-10-30 PROCEDURE — 59025 FETAL NON-STRESS TEST: CPT | Performed by: NURSE PRACTITIONER

## 2023-10-30 PROCEDURE — 99214 OFFICE O/P EST MOD 30 MIN: CPT | Performed by: NURSE PRACTITIONER

## 2023-10-30 PROCEDURE — 0501F PRENATAL FLOW SHEET: CPT | Performed by: NURSE PRACTITIONER

## 2023-10-30 NOTE — PROCEDURES
Raegan Ozuna, a  at 32w6d with an KATHLEEN of 2023, by Last Menstrual Period, was seen at [facility] for a nonstress test.    Non-Stress Test   Baseline Fetal Heart Rate for Non-Stress Test: 130 BPM (initially 130, then changed to 120)  Variability in Waveform for Non-Stress Test: Moderate  Accelerations in Non-Stress Test: Yes  Decelerations in Non-Stress Test: None  Contractions in Non-Stress Test: Not present  Acoustic Stimulator for Non-Stress Test: Yes  Interpretation of Non-Stress Test   Interpretation of Non-Stress Test: Reactive      Noa Encinas, APRN-CNP

## 2023-10-30 NOTE — PROGRESS NOTES
Raegan Ozuna is a 37 y.o. at 32w6d here for F/U prenatal visit with [facility]    Her pregnancy is complicated by:   GDM- recently initiated insulin (previously nutrition controlled)   Obesity   GBS +    Overall she reports  feeling well. Feeling +FM. Denies VB, LOF, or CTXs.     Concerns today: Continued increased fasting levels. Fastings mostly above goal with postprandial numbers between 100 and 120.   Visit Vitals  /69   Wt 98 kg (216 lb)   LMP 03/14/2023   BMI 38.26 kg/m²   OB Status Pregnant   Smoking Status Never   BSA 2.09 m²      Gen-NAD  Cardiac- good peripheral perfusion  Respiratory- non-labored breathing  Abdomen- soft, non-tender, gravid   Extremities- symmetrical   Pelvic- deferred      NST add-on- reactive with 45 minutes of monitoring and vibroacoustic stim x1 - additional monitoring really due to poor tracing of the fetus for intermittent periods of time during testing  B/L 130 then changed to 120, moderate variability, +accels, -decels, toco: quiet     Problem List Items Addressed This Visit          Pregnancy    Excessive fetal growth affecting management of pregnancy in third trimester    Overview     -Previously counseled on risk   -Already planning for rCD            Gestational diabetes mellitus (GDM) in third trimester - Primary    Overview     -ROXANNA to [facility] now (10/30)  -1hr-138 (9/26), 3hr (10/5)- fasting 80, 1hr 192, 2hr 161, 3hr 99  - Attended Boot Camp  - [facility] Consult completed 10/16  -Weekly communication with blood sugar line  -Serial growth ultrasounds starting at 28 weeks    Last ultrasound: EFW 96%, AC >99%, BPP 8/8, normal MANUEL    Fetal surveillance if on medications, LGA, poly, or uncontrolled/level of control unknown:  -Weekly at 32 weeks - 10/30- reviewed need to begin this week    -Twice weekly at 36 weeks    Current Regimen: Insulin initiated 10/23 at bedtime for elevated fasting levels --> NPH 10iu at bedtime --- with review of BG log will plan to increase to NPH 14iu* at bedtime  (10/30)    Delivery Plan: pending 36 week delivery planning  Intrapartum:  GDM protocol  Postpartum: No medication    Recommend PP 2hr gtt and q3yr F/U with PCP for A1C and TSH           Relevant Orders    Fetal nonstress test, once    Obesity in pregnancy    Overview     -Previously counseled  -IOM weight gain guidelines 11-20 lbs   - testing weekly now, twice weekly at 36 wks   -See GDM dx              Supervision of high risk pregnancy in third trimester    Overview     -Was shared care with Dr. Tilley--> ROXANNA to [facility] now   -Weekly communication with blood sugar line  -Repeat growth x1 wk (LGA status)            Urinary tract infection in mother during third trimester of pregnancy    Overview     -Hospitalized in early pregnancy and treated for sepsis with readmission a few days later as symptoms returned, GBS + urine, ? Pyelonephritis  -On ppx macrobid daily now  -Treat for GBS + at time of delivery          Uterine scar from previous  delivery    Overview     -CD x2  -Plans rCD   -Deciding on delivery at [facility] vs [facility], leaning toward [facility]  -Delivery planning visit for GDM at or around 36 wks             Growth next week  PNV x 2 wks  Weekly  testing now that pt is on insulin  Add on NST today reactive       Noa Encinas, APRN-CNP

## 2023-11-06 ENCOUNTER — HOSPITAL ENCOUNTER (OUTPATIENT)
Dept: RADIOLOGY | Facility: HOSPITAL | Age: 38
Discharge: HOME | End: 2023-11-06
Payer: COMMERCIAL

## 2023-11-06 ENCOUNTER — TELEPHONE (OUTPATIENT)
Dept: MATERNAL FETAL MEDICINE | Facility: CLINIC | Age: 38
End: 2023-11-06
Payer: COMMERCIAL

## 2023-11-06 DIAGNOSIS — O24.419 GDM (GESTATIONAL DIABETES MELLITUS) (HHS-HCC): ICD-10-CM

## 2023-11-06 DIAGNOSIS — Z36.4 ULTRASOUND FOR ANTENATAL SCREENING FOR FETAL GROWTH RESTRICTION (HHS-HCC): ICD-10-CM

## 2023-11-06 PROCEDURE — 76819 FETAL BIOPHYS PROFIL W/O NST: CPT

## 2023-11-06 PROCEDURE — 76819 FETAL BIOPHYS PROFIL W/O NST: CPT | Performed by: OBSTETRICS & GYNECOLOGY

## 2023-11-06 PROCEDURE — 76816 OB US FOLLOW-UP PER FETUS: CPT

## 2023-11-06 PROCEDURE — 76816 OB US FOLLOW-UP PER FETUS: CPT | Performed by: OBSTETRICS & GYNECOLOGY

## 2023-11-06 NOTE — TELEPHONE ENCOUNTER
Communicated with the patient on 11/6/2023  She has Gestational Diabetes @ 33w6d     The patient checks her sugars fasting and 1 hour after meals. Her current regimen is as follows:  NPH 14 at bedtime    The patient's reported blood sugars appear well controlled, with 80% within the goal range. Had fasting of 56 this morning, no notes indicating symptoms of lows. If <70 again, please contact the Blood Sugar Line to discuss.    Patient understands to submit sugar log for review weekly through the Blood Sugar Line @ [phone] or via email to [email or web address] to help optimize glucose control.    A voice mail message was left at the contact number provided by the patient.

## 2023-11-12 NOTE — PROGRESS NOTES
Raegan Ozuna is a 37 y.o. at 34w6d here for F/U prenatal visit with [facility]    Her pregnancy is complicated by:   GDM   Obesity   UTI     Overall she reports feeling well. Feeling +FM. Denies VB, LOF, or CTXs.     Concerns today: no new concerns today     With review of BG log, pt's GDM is well controlled on current insulin regimen. Some postprandial outliers but more than 80% within goal range.     Visit Vitals  /75   Wt 98 kg (216 lb)   LMP 03/14/2023   BMI 38.26 kg/m²   OB Status Pregnant   Smoking Status Never   BSA 2.09 m²      Gen-NAD  Cardiac- good peripheral perfusion  Respiratory- non-labored breathing  Abdomen- soft, non-tender, gravid   Extremities- symmetrical   Pelvic- deferred      NST -reactive  B/L 135, moderate variability, +accels, -decels, toco: quiet     Last growth 11/6- EFW 94%, AC >99%    Problem List Items Addressed This Visit          Pregnancy    Excessive fetal growth affecting management of pregnancy in third trimester - Primary    Overview     -Previously counseled on risk   -Already planning for rCD - discussed delivery at 38 wks, pt agreeable. Will task RN to schedule. Will plan for 38.1 with Dr. Nair and this CNP to assist.            Gestational diabetes mellitus (GDM) in third trimester    Overview     -ROXANNA to [facility] now (10/30)  -1hr-138 (9/26), 3hr (10/5)- fasting 80, 1hr 192, 2hr 161, 3hr 99  - Attended Boot Camp  - [facility] Consult completed 10/16  -Weekly communication with blood sugar line  -Serial growth ultrasounds starting at 28 weeks    Last ultrasound: EFW 96%, AC >99%, BPP 8/8, normal MANUEL    Fetal surveillance if on medications, LGA, poly, or uncontrolled/level of control unknown:  -Weekly at 32 weeks - 10/30- reviewed need to begin this week    -Twice weekly at 36 weeks    Current Regimen: Insulin initiated 10/23 at bedtime for elevated fasting levels ; Current regimen NPH 14iu at bedtime, no changes today with BG log review     Delivery Plan: 38.1-11/29, RN tasked to  schedule repeat CD   Intrapartum:  GDM protocol  Postpartum: No medication    Recommend PP 2hr gtt and q3yr F/U with PCP for A1C and TSH           Obesity in pregnancy    Overview     -Previously counseled  -IOM weight gain guidelines 11-20 lbs   - testing weekly now, twice weekly at 36 wks   -See GDM dx              Supervision of high risk pregnancy in third trimester    Overview     -Was shared care with Dr. Tilley--> ROXANNA to [facility] at time of MFM consult   -Weekly communication with blood sugar line  -Weekly  testing, increase to twice weekly at 36 wks   -GBS not needed   -Tdap today  -s/p flu     -PNV weekly   -Plan for delivery at 38.1 wks - rCD with Dr. Nair and this CNP to assist             Relevant Orders    POC Urine Dip    Urinary tract infection in mother during third trimester of pregnancy    Overview     -Hospitalized in early pregnancy and treated for sepsis with readmission a few days later as symptoms returned, GBS + urine, ? Pyelonephritis  -On ppx macrobid daily now  -Treat for GBS + at time of delivery          Uterine scar from previous  delivery    Overview     -CD x2  -Plans rCD   -Deciding on delivery at [facility] vs [facility], leaning toward [facility]  -Delivery planning visit for GDM at or around 36 wks               PNV weekly   No need for GBS- GBS+ urine in early pregnancy   Tdap today      MADISON Porras-CNP

## 2023-11-13 ENCOUNTER — PROCEDURE VISIT (OUTPATIENT)
Dept: OBSTETRICS AND GYNECOLOGY | Facility: HOSPITAL | Age: 38
End: 2023-11-13
Payer: COMMERCIAL

## 2023-11-13 ENCOUNTER — ROUTINE PRENATAL (OUTPATIENT)
Dept: MATERNAL FETAL MEDICINE | Facility: HOSPITAL | Age: 38
End: 2023-11-13
Payer: COMMERCIAL

## 2023-11-13 VITALS — BODY MASS INDEX: 38.26 KG/M2 | WEIGHT: 216 LBS | DIASTOLIC BLOOD PRESSURE: 75 MMHG | SYSTOLIC BLOOD PRESSURE: 114 MMHG

## 2023-11-13 DIAGNOSIS — O24.419 GESTATIONAL DIABETES MELLITUS (GDM) IN THIRD TRIMESTER, GESTATIONAL DIABETES METHOD OF CONTROL UNSPECIFIED (HHS-HCC): Primary | ICD-10-CM

## 2023-11-13 DIAGNOSIS — O36.63X0 EXCESSIVE FETAL GROWTH AFFECTING MANAGEMENT OF PREGNANCY IN THIRD TRIMESTER, SINGLE OR UNSPECIFIED FETUS (HHS-HCC): Primary | ICD-10-CM

## 2023-11-13 DIAGNOSIS — N85.A UTERINE SCAR FROM PREVIOUS CESAREAN DELIVERY: ICD-10-CM

## 2023-11-13 DIAGNOSIS — O24.414 INSULIN CONTROLLED GESTATIONAL DIABETES MELLITUS (GDM) IN THIRD TRIMESTER (HHS-HCC): ICD-10-CM

## 2023-11-13 DIAGNOSIS — O99.210 OBESITY IN PREGNANCY (HHS-HCC): ICD-10-CM

## 2023-11-13 DIAGNOSIS — O09.93 SUPERVISION OF HIGH RISK PREGNANCY IN THIRD TRIMESTER (HHS-HCC): ICD-10-CM

## 2023-11-13 DIAGNOSIS — O23.43 URINARY TRACT INFECTION IN MOTHER DURING THIRD TRIMESTER OF PREGNANCY (HHS-HCC): ICD-10-CM

## 2023-11-13 LAB
POC APPEARANCE, URINE: CLEAR
POC BILIRUBIN, URINE: NEGATIVE
POC BLOOD, URINE: NEGATIVE
POC COLOR, URINE: YELLOW
POC GLUCOSE, URINE: NEGATIVE MG/DL
POC KETONES, URINE: NEGATIVE MG/DL
POC LEUKOCYTES, URINE: NEGATIVE
POC NITRITE,URINE: NEGATIVE
POC PH, URINE: 7 PH
POC PROTEIN, URINE: NEGATIVE MG/DL
POC SPECIFIC GRAVITY, URINE: 1.01
POC UROBILINOGEN, URINE: 0.2 EU/DL

## 2023-11-13 PROCEDURE — 81003 URINALYSIS AUTO W/O SCOPE: CPT | Performed by: NURSE PRACTITIONER

## 2023-11-13 PROCEDURE — 59025 FETAL NON-STRESS TEST: CPT | Performed by: NURSE PRACTITIONER

## 2023-11-13 PROCEDURE — 99214 OFFICE O/P EST MOD 30 MIN: CPT | Mod: 25 | Performed by: NURSE PRACTITIONER

## 2023-11-13 PROCEDURE — 90471 IMMUNIZATION ADMIN: CPT | Performed by: NURSE PRACTITIONER

## 2023-11-13 PROCEDURE — 99214 OFFICE O/P EST MOD 30 MIN: CPT | Performed by: NURSE PRACTITIONER

## 2023-11-13 NOTE — PROGRESS NOTES
Raegan Ozuna, a  at 34w6d with an KATHLEEN of 2023, by Last Menstrual Period, was seen at [facility] for a nonstress test for  GDM and excessive fetal growth.     Pt was seen for a visit in clinic today.     NST was documented and billed for in that encounter.     Noa Encinas, APRN-CNP

## 2023-11-13 NOTE — PROCEDURES
Raegan Ozuna, a  at 34w6d with an KATHLEEN of 2023, by Last Menstrual Period, was seen at [facility] for a nonstress test.    Non-Stress Test   Baseline Fetal Heart Rate for Non-Stress Test: 135 BPM  Variability in Waveform for Non-Stress Test: Moderate  Accelerations in Non-Stress Test: Yes  Decelerations in Non-Stress Test: None  Contractions in Non-Stress Test: Not present  Acoustic Stimulator for Non-Stress Test: No  Interpretation of Non-Stress Test   Interpretation of Non-Stress Test: Reactive  NST for Multiple Fetuses: No      Noa Encinas, APRN-CNP

## 2023-11-14 ENCOUNTER — TELEPHONE (OUTPATIENT)
Dept: OBSTETRICS AND GYNECOLOGY | Facility: HOSPITAL | Age: 38
End: 2023-11-14
Payer: COMMERCIAL

## 2023-11-14 NOTE — TELEPHONE ENCOUNTER
Attempt to call patient for CD date and time.    ----- Message from MADISON Porras-CNP sent at 2023  8:07 PM EST -----  Can we schedule her for repeat  at 38.1 wks on ? Plan is for Dr. Nair to be the surgeon with me assisting first case if possible. She has an LGA fetus and GDM on insulin.     Thanks,   Noa

## 2023-11-15 ENCOUNTER — PREP FOR PROCEDURE (OUTPATIENT)
Dept: OBSTETRICS AND GYNECOLOGY | Facility: HOSPITAL | Age: 38
End: 2023-11-15
Payer: COMMERCIAL

## 2023-11-15 DIAGNOSIS — O24.414 INSULIN CONTROLLED GESTATIONAL DIABETES MELLITUS (GDM) IN THIRD TRIMESTER (HHS-HCC): ICD-10-CM

## 2023-11-15 DIAGNOSIS — N85.A UTERINE SCAR FROM PREVIOUS CESAREAN DELIVERY: Primary | ICD-10-CM

## 2023-11-15 DIAGNOSIS — O36.61X0: ICD-10-CM

## 2023-11-15 DIAGNOSIS — O36.60X0 EXCESSIVE FETAL GROWTH AFFECTING MANAGEMENT OF PREGNANCY, ANTEPARTUM, SINGLE OR UNSPECIFIED FETUS (HHS-HCC): ICD-10-CM

## 2023-11-19 NOTE — PROGRESS NOTES
Raegan Ozuna is a 37 y.o. at 35w6d here for F/U prenatal visit with [facility]    Her pregnancy is complicated by:   GDM on insulin    Overall she reports  feeling well. Feeling +FM. Denies VB, LOF, or CTXs.     Concerns today: no new concerns today    Visit Vitals  /75   Wt 98.4 kg (217 lb)   LMP 03/14/2023   BMI 38.44 kg/m²   OB Status Pregnant   Smoking Status Never   BSA 2.09 m²      Gen-NAD  Cardiac- good peripheral perfusion  Respiratory- non-labored breathing  Abdomen- soft, non-tender, gravid   Extremities- symmetrical   Pelvic- deferred      Sono- BPP 8/8, normal MANUEL, MVP 8.7 - continue with weekly BPP, will recheck fluid next week    Problem List Items Addressed This Visit          Pregnancy    Excessive fetal growth affecting management of pregnancy in third trimester - Primary    Overview     -Previously counseled on risk   -Already planning for rCD - discussed delivery at 38 wks, pt agreeable. Will task RN to schedule. Will plan for 38.1 with Dr. Nair and this CNP to assist- scheduled for 12/6            Gestational diabetes mellitus (GDM) in third trimester    Overview     -ROXANNA to [facility] now (10/30)  -1hr-138 (9/26), 3hr (10/5)- fasting 80, 1hr 192, 2hr 161, 3hr 99  - Attended Boot Camp  - [facility] Consult completed 10/16  -Weekly communication with blood sugar line  -Serial growth ultrasounds starting at 28 weeks    Last growth 11/6: EFW 96%, AC >99%, BPP 8/8, normal MANUEL    11/20- BPP 8/8, normal MANUEL, MVP 8.7     Fetal surveillance if on medications, LGA, poly, or uncontrolled/level of control unknown:  -Weekly at 32 weeks - 10/30- reviewed need to begin this week    -Twice weekly at 36 weeks    Current Regimen: Insulin initiated 10/23 at bedtime for elevated fasting levels ; Current regimen NPH 14iu at bedtime, no changes today with BG log review     Delivery Plan: 38.1-11/29, RN tasked to schedule repeat CD   Intrapartum:  GDM protocol  Postpartum: No medication    Recommend PP 2hr gtt and q3yr F/U with  PCP for A1C and TSH           Obesity in pregnancy    Overview     -Previously counseled  -IOM weight gain guidelines 11-20 lbs   - testing weekly now, twice weekly at 36 wks   -See GDM dx              Supervision of high risk pregnancy in third trimester    Overview     -Was shared care with Dr. Tilley--> ROXANNA to [facility] at time of [facility] consult   -Weekly communication with blood sugar line  -Weekly  testing, increase to twice weekly at 36 wks   -GBS not needed   -s/p Tdap   -s/p flu     -PNV weekly   -Plan for delivery at 38.1 wks - rCD with Dr. Nair and this CNP to assist ,              Urinary tract infection in mother during third trimester of pregnancy    Overview     -Hospitalized in early pregnancy and treated for sepsis with readmission a few days later as symptoms returned, GBS + urine, ? Pyelonephritis  -On ppx macrobid daily now  -Treat for GBS + at time of delivery          Uterine scar from previous  delivery    Overview     -CD x2  -Plans rCD   -Deciding on delivery at [facility] vs [facility], leaning toward [facility]  -Delivery planning visit for GDM at or around 36 wks               PNV weekly, continue twice weekly  testing   Plan for rCD on  with Dr. Nair and this CNP to first assist      MADISON Porras-CNP

## 2023-11-20 ENCOUNTER — HOSPITAL ENCOUNTER (OUTPATIENT)
Dept: RADIOLOGY | Facility: HOSPITAL | Age: 38
Discharge: HOME | End: 2023-11-20
Payer: COMMERCIAL

## 2023-11-20 ENCOUNTER — ROUTINE PRENATAL (OUTPATIENT)
Dept: MATERNAL FETAL MEDICINE | Facility: HOSPITAL | Age: 38
End: 2023-11-20
Payer: COMMERCIAL

## 2023-11-20 VITALS — BODY MASS INDEX: 38.44 KG/M2 | SYSTOLIC BLOOD PRESSURE: 120 MMHG | DIASTOLIC BLOOD PRESSURE: 75 MMHG | WEIGHT: 217 LBS

## 2023-11-20 DIAGNOSIS — O99.210 OBESITY IN PREGNANCY (HHS-HCC): ICD-10-CM

## 2023-11-20 DIAGNOSIS — O24.414 INSULIN CONTROLLED GESTATIONAL DIABETES MELLITUS (GDM) IN THIRD TRIMESTER (HHS-HCC): ICD-10-CM

## 2023-11-20 DIAGNOSIS — N85.A UTERINE SCAR FROM PREVIOUS CESAREAN DELIVERY: ICD-10-CM

## 2023-11-20 DIAGNOSIS — O36.63X0 EXCESSIVE FETAL GROWTH AFFECTING MANAGEMENT OF PREGNANCY IN THIRD TRIMESTER, SINGLE OR UNSPECIFIED FETUS (HHS-HCC): Primary | ICD-10-CM

## 2023-11-20 DIAGNOSIS — O24.414 GESTATIONAL DIABETES MELLITUS (GDM) REQUIRING INSULIN (HHS-HCC): ICD-10-CM

## 2023-11-20 DIAGNOSIS — O23.43 URINARY TRACT INFECTION IN MOTHER DURING THIRD TRIMESTER OF PREGNANCY (HHS-HCC): ICD-10-CM

## 2023-11-20 DIAGNOSIS — O09.93 SUPERVISION OF HIGH RISK PREGNANCY IN THIRD TRIMESTER (HHS-HCC): ICD-10-CM

## 2023-11-20 LAB
POC APPEARANCE, URINE: CLEAR
POC BILIRUBIN, URINE: NEGATIVE
POC BLOOD, URINE: ABNORMAL
POC COLOR, URINE: YELLOW
POC GLUCOSE, URINE: NEGATIVE MG/DL
POC KETONES, URINE: NEGATIVE MG/DL
POC LEUKOCYTES, URINE: NEGATIVE
POC NITRITE,URINE: NEGATIVE
POC PH, URINE: 7 PH
POC PROTEIN, URINE: NEGATIVE MG/DL
POC SPECIFIC GRAVITY, URINE: 1.02
POC UROBILINOGEN, URINE: 0.2 EU/DL

## 2023-11-20 PROCEDURE — 81003 URINALYSIS AUTO W/O SCOPE: CPT | Performed by: NURSE PRACTITIONER

## 2023-11-20 PROCEDURE — 99213 OFFICE O/P EST LOW 20 MIN: CPT | Performed by: NURSE PRACTITIONER

## 2023-11-22 ENCOUNTER — PROCEDURE VISIT (OUTPATIENT)
Dept: OBSTETRICS AND GYNECOLOGY | Facility: HOSPITAL | Age: 38
End: 2023-11-22
Payer: COMMERCIAL

## 2023-11-22 VITALS — DIASTOLIC BLOOD PRESSURE: 75 MMHG | BODY MASS INDEX: 38.09 KG/M2 | SYSTOLIC BLOOD PRESSURE: 115 MMHG | WEIGHT: 215 LBS

## 2023-11-22 DIAGNOSIS — O24.419 GESTATIONAL DIABETES MELLITUS (GDM) IN THIRD TRIMESTER, GESTATIONAL DIABETES METHOD OF CONTROL UNSPECIFIED (HHS-HCC): Primary | ICD-10-CM

## 2023-11-22 PROCEDURE — 59025 FETAL NON-STRESS TEST: CPT | Performed by: OBSTETRICS & GYNECOLOGY

## 2023-11-22 ASSESSMENT — ENCOUNTER SYMPTOMS
EYES NEGATIVE: 0
NEUROLOGICAL NEGATIVE: 0
ENDOCRINE NEGATIVE: 0
RESPIRATORY NEGATIVE: 0
ALLERGIC/IMMUNOLOGIC NEGATIVE: 0
MUSCULOSKELETAL NEGATIVE: 0
PSYCHIATRIC NEGATIVE: 0
CARDIOVASCULAR NEGATIVE: 0
HEMATOLOGIC/LYMPHATIC NEGATIVE: 0
CONSTITUTIONAL NEGATIVE: 0
GASTROINTESTINAL NEGATIVE: 0

## 2023-11-26 NOTE — PROGRESS NOTES
Raegan Ozuna is a 37 y.o. at 36w6d here for F/U prenatal visit with [facility]    Her pregnancy is complicated by:   GDM- on insulin. BG log reviewed today. See GDM below for any changes  Complicated UTI early in pregnancy- on ppx   LGA      Overall she reports feeling well. Feeling +FM. Denies VB, LOF, or CTXs.     Concerns today: no new concerns    Visit Vitals  /81   Wt 99.3 kg (219 lb)   LMP 03/14/2023   BMI 38.79 kg/m²   OB Status Pregnant   Smoking Status Never   BSA 2.1 m²      Gen-NAD  Cardiac- good peripheral perfusion  Respiratory- non-labored breathing  Abdomen- soft, non-tender, gravid   Extremities- symmetrical   Pelvic- deferred      Sono- EFW 93% (3589g), AC >99%, MANUEL wnl, MVP 8.5     Problem List Items Addressed This Visit          Pregnancy    Excessive fetal growth affecting management of pregnancy in third trimester - Primary    Overview     -Previously counseled on risk   -Already planning for rCD - discussed delivery at 38 wks, pt agreeable. Will task RN to schedule. Will plan for 38.1 with Dr. Nair and this CNP to assist- scheduled for 12/6            Gestational diabetes mellitus (GDM) in third trimester    Overview     -ROXANNA to [facility] (10/30)  -1hr-138 (9/26), 3hr (10/5)- fasting 80, 1hr 192, 2hr 161, 3hr 99  - Attended Boot Camp  - [facility] Consult completed 10/16  -Weekly communication with blood sugar line  -Serial growth ultrasounds starting at 28 weeks    Last growth 11/6: EFW 96%, AC >99%, BPP 8/8, normal MANUEL    11/20- BPP 8/8, normal MANUEL, MVP 8.7     Fetal surveillance if on medications, LGA, poly, or uncontrolled/level of control unknown:    -Twice weekly now until delivery    Current Regimen: Insulin initiated 10/23 at bedtime for elevated fasting levels ; Current regimen NPH 14iu at bedtime, no changes today with BG log review - few outliers, but more than 80% within goal range     Delivery Plan: 38.1- 12/6 with Dr. Nair   Intrapartum:  GDM protocol  Postpartum: No  medication    Recommend PP 2hr gtt and q3yr F/U with PCP for A1C and TSH           Obesity in pregnancy    Overview     -Previously counseled  -IOM weight gain guidelines 11-20 lbs   - testing twice weekly now  -See GDM dx              Supervision of high risk pregnancy in third trimester    Overview     -Was shared care with Dr. Tilley--> ROXANNA to [facility] at time of [facility] consult   -Weekly communication with blood sugar line  -Weekly  testing, increase to twice weekly at 36 wks   -GBS not needed   -s/p Tdap   -s/p flu     -PNV weekly   -Plan for delivery at 38.1 wks - rCD with Dr. Nair and this CNP to assist ,              Relevant Orders    POC Urine Dip (Completed)    Urinary tract infection in mother during third trimester of pregnancy    Overview     -Hospitalized in early pregnancy and treated for sepsis with readmission a few days later as symptoms returned, GBS + urine, ? Pyelonephritis  -On ppx macrobid daily now  -Treat for GBS + at time of delivery          Uterine scar from previous  delivery    Overview     -CD x2  -Plans rCD   -Delivering at [facility] at 38 wks                Twice weekly  testing until delivery  PNV weekly until delivery   GBS +urine early in pregnancy, no need to do GBS culture  rcD planned for       MADISON Porras-CNP    oriented to person, place and time

## 2023-11-27 ENCOUNTER — HOSPITAL ENCOUNTER (OUTPATIENT)
Dept: RADIOLOGY | Facility: HOSPITAL | Age: 38
Discharge: HOME | End: 2023-11-27
Payer: COMMERCIAL

## 2023-11-27 ENCOUNTER — ROUTINE PRENATAL (OUTPATIENT)
Dept: MATERNAL FETAL MEDICINE | Facility: HOSPITAL | Age: 38
End: 2023-11-27
Payer: COMMERCIAL

## 2023-11-27 VITALS — BODY MASS INDEX: 38.79 KG/M2 | SYSTOLIC BLOOD PRESSURE: 126 MMHG | DIASTOLIC BLOOD PRESSURE: 81 MMHG | WEIGHT: 219 LBS

## 2023-11-27 DIAGNOSIS — O09.523 SUPERVISION OF ELDERLY MULTIGRAVIDA, THIRD TRIMESTER (HHS-HCC): ICD-10-CM

## 2023-11-27 DIAGNOSIS — N85.A UTERINE SCAR FROM PREVIOUS CESAREAN DELIVERY: ICD-10-CM

## 2023-11-27 DIAGNOSIS — O99.213 OBESITY COMPLICATING PREGNANCY, THIRD TRIMESTER (HHS-HCC): ICD-10-CM

## 2023-11-27 DIAGNOSIS — O23.43 URINARY TRACT INFECTION IN MOTHER DURING THIRD TRIMESTER OF PREGNANCY (HHS-HCC): ICD-10-CM

## 2023-11-27 DIAGNOSIS — O09.93 SUPERVISION OF HIGH RISK PREGNANCY IN THIRD TRIMESTER (HHS-HCC): ICD-10-CM

## 2023-11-27 DIAGNOSIS — O24.419 GESTATIONAL DIABETES MELLITUS (GDM) IN THIRD TRIMESTER, GESTATIONAL DIABETES METHOD OF CONTROL UNSPECIFIED (HHS-HCC): ICD-10-CM

## 2023-11-27 DIAGNOSIS — O24.312 UNSPECIFIED PRE-EXISTING DIABETES MELLITUS IN PREGNANCY, SECOND TRIMESTER (HHS-HCC): ICD-10-CM

## 2023-11-27 DIAGNOSIS — O34.219 MATERNAL CARE FOR UNSPECIFIED TYPE SCAR FROM PREVIOUS CESAREAN DELIVERY (HHS-HCC): ICD-10-CM

## 2023-11-27 DIAGNOSIS — Z92.89 HX OF FETAL BIOPHYSICAL PROFILE: ICD-10-CM

## 2023-11-27 DIAGNOSIS — O99.210 OBESITY IN PREGNANCY (HHS-HCC): ICD-10-CM

## 2023-11-27 DIAGNOSIS — O36.63X0 EXCESSIVE FETAL GROWTH AFFECTING MANAGEMENT OF PREGNANCY IN THIRD TRIMESTER, SINGLE OR UNSPECIFIED FETUS (HHS-HCC): Primary | ICD-10-CM

## 2023-11-27 LAB
POC APPEARANCE, URINE: CLEAR
POC BILIRUBIN, URINE: NEGATIVE
POC BLOOD, URINE: NEGATIVE
POC COLOR, URINE: NORMAL
POC GLUCOSE, URINE: NEGATIVE MG/DL
POC KETONES, URINE: NEGATIVE MG/DL
POC LEUKOCYTES, URINE: NEGATIVE
POC NITRITE,URINE: NEGATIVE
POC PH, URINE: 7 PH
POC PROTEIN, URINE: NEGATIVE MG/DL
POC SPECIFIC GRAVITY, URINE: 1.01
POC UROBILINOGEN, URINE: 0.2 EU/DL

## 2023-11-27 PROCEDURE — 99214 OFFICE O/P EST MOD 30 MIN: CPT | Performed by: NURSE PRACTITIONER

## 2023-11-27 PROCEDURE — 76816 OB US FOLLOW-UP PER FETUS: CPT | Performed by: OBSTETRICS & GYNECOLOGY

## 2023-11-27 PROCEDURE — 76819 FETAL BIOPHYS PROFIL W/O NST: CPT | Performed by: OBSTETRICS & GYNECOLOGY

## 2023-11-27 PROCEDURE — 81003 URINALYSIS AUTO W/O SCOPE: CPT | Performed by: NURSE PRACTITIONER

## 2023-11-27 PROCEDURE — 76819 FETAL BIOPHYS PROFIL W/O NST: CPT

## 2023-11-27 PROCEDURE — 76816 OB US FOLLOW-UP PER FETUS: CPT

## 2023-11-30 ENCOUNTER — PROCEDURE VISIT (OUTPATIENT)
Dept: OBSTETRICS AND GYNECOLOGY | Facility: HOSPITAL | Age: 38
End: 2023-11-30
Payer: COMMERCIAL

## 2023-11-30 VITALS — DIASTOLIC BLOOD PRESSURE: 67 MMHG | SYSTOLIC BLOOD PRESSURE: 106 MMHG

## 2023-11-30 DIAGNOSIS — O36.63X1 EXCESSIVE FETAL GROWTH AFFECTING MANAGEMENT OF PREGNANCY IN THIRD TRIMESTER, FETUS 1 OF MULTIPLE GESTATION (HHS-HCC): ICD-10-CM

## 2023-11-30 DIAGNOSIS — O23.43 URINARY TRACT INFECTION IN MOTHER DURING THIRD TRIMESTER OF PREGNANCY (HHS-HCC): ICD-10-CM

## 2023-11-30 DIAGNOSIS — O99.210 OBESITY IN PREGNANCY (HHS-HCC): ICD-10-CM

## 2023-11-30 DIAGNOSIS — O24.414 INSULIN CONTROLLED GESTATIONAL DIABETES MELLITUS (GDM) IN THIRD TRIMESTER (HHS-HCC): Primary | ICD-10-CM

## 2023-11-30 DIAGNOSIS — O09.93 SUPERVISION OF HIGH RISK PREGNANCY IN THIRD TRIMESTER (HHS-HCC): ICD-10-CM

## 2023-11-30 DIAGNOSIS — N85.A UTERINE SCAR FROM PREVIOUS CESAREAN DELIVERY: ICD-10-CM

## 2023-11-30 PROCEDURE — 59025 FETAL NON-STRESS TEST: CPT | Performed by: NURSE PRACTITIONER

## 2023-11-30 NOTE — PROGRESS NOTES
Pt here for NST AMA GDM 37-2d   NST read by Abdirizak WALLACE-RUBEN     NST reactive. See results tab for full details.     Noa Encinas, APRN-CNP

## 2023-11-30 NOTE — PROCEDURES
Raegan Ozuna, a  at 37w2d with an KATHLEEN of 2023, by Last Menstrual Period, was seen at [facility] for a nonstress test.    Non-Stress Test   Baseline Fetal Heart Rate for Non-Stress Test: 130 BPM  Variability in Waveform for Non-Stress Test: Moderate  Accelerations in Non-Stress Test: Yes, greater than/equal to 15 bpm  Decelerations in Non-Stress Test: None  Acoustic Stimulator for Non-Stress Test: No  Interpretation of Non-Stress Test   Interpretation of Non-Stress Test: Reactive  NST for Multiple Fetuses: No               Noa Encinas, APRN-CNP

## 2023-12-04 ENCOUNTER — HOSPITAL ENCOUNTER (OUTPATIENT)
Dept: RADIOLOGY | Facility: HOSPITAL | Age: 38
Discharge: HOME | End: 2023-12-04
Payer: COMMERCIAL

## 2023-12-04 ENCOUNTER — LAB (OUTPATIENT)
Dept: LAB | Facility: LAB | Age: 38
End: 2023-12-04
Payer: COMMERCIAL

## 2023-12-04 ENCOUNTER — ROUTINE PRENATAL (OUTPATIENT)
Dept: MATERNAL FETAL MEDICINE | Facility: HOSPITAL | Age: 38
End: 2023-12-04
Payer: COMMERCIAL

## 2023-12-04 VITALS — DIASTOLIC BLOOD PRESSURE: 78 MMHG | SYSTOLIC BLOOD PRESSURE: 121 MMHG | WEIGHT: 219 LBS | BODY MASS INDEX: 38.79 KG/M2

## 2023-12-04 DIAGNOSIS — Z92.89: ICD-10-CM

## 2023-12-04 DIAGNOSIS — O23.43 URINARY TRACT INFECTION IN MOTHER DURING THIRD TRIMESTER OF PREGNANCY (HHS-HCC): ICD-10-CM

## 2023-12-04 DIAGNOSIS — O36.63X0 EXCESSIVE FETAL GROWTH AFFECTING MANAGEMENT OF PREGNANCY IN THIRD TRIMESTER, SINGLE OR UNSPECIFIED FETUS (HHS-HCC): Primary | ICD-10-CM

## 2023-12-04 DIAGNOSIS — O09.93 SUPERVISION OF HIGH RISK PREGNANCY IN THIRD TRIMESTER (HHS-HCC): ICD-10-CM

## 2023-12-04 DIAGNOSIS — N85.A UTERINE SCAR FROM PREVIOUS CESAREAN DELIVERY: ICD-10-CM

## 2023-12-04 DIAGNOSIS — O99.210 OBESITY IN PREGNANCY (HHS-HCC): ICD-10-CM

## 2023-12-04 DIAGNOSIS — O24.419 GESTATIONAL DIABETES MELLITUS (GDM) IN THIRD TRIMESTER, GESTATIONAL DIABETES METHOD OF CONTROL UNSPECIFIED (HHS-HCC): ICD-10-CM

## 2023-12-04 LAB
ABO GROUP (TYPE) IN BLOOD: NORMAL
ANTIBODY SCREEN: NORMAL
BASOPHILS # BLD AUTO: 0.05 X10*3/UL (ref 0–0.1)
BASOPHILS NFR BLD AUTO: 0.6 %
EOSINOPHIL # BLD AUTO: 0.14 X10*3/UL (ref 0–0.7)
EOSINOPHIL NFR BLD AUTO: 1.6 %
ERYTHROCYTE [DISTWIDTH] IN BLOOD BY AUTOMATED COUNT: 13.2 % (ref 11.5–14.5)
HCT VFR BLD AUTO: 38.9 % (ref 36–46)
HGB BLD-MCNC: 12.6 G/DL (ref 12–16)
IMM GRANULOCYTES # BLD AUTO: 0.15 X10*3/UL (ref 0–0.7)
IMM GRANULOCYTES NFR BLD AUTO: 1.7 % (ref 0–0.9)
LYMPHOCYTES # BLD AUTO: 1.84 X10*3/UL (ref 1.2–4.8)
LYMPHOCYTES NFR BLD AUTO: 21.2 %
MCH RBC QN AUTO: 29 PG (ref 26–34)
MCHC RBC AUTO-ENTMCNC: 32.4 G/DL (ref 32–36)
MCV RBC AUTO: 90 FL (ref 80–100)
MONOCYTES # BLD AUTO: 0.87 X10*3/UL (ref 0.1–1)
MONOCYTES NFR BLD AUTO: 10 %
NEUTROPHILS # BLD AUTO: 5.63 X10*3/UL (ref 1.2–7.7)
NEUTROPHILS NFR BLD AUTO: 64.9 %
NRBC BLD-RTO: 0 /100 WBCS (ref 0–0)
PLATELET # BLD AUTO: 215 X10*3/UL (ref 150–450)
POC APPEARANCE, URINE: ABNORMAL
POC BILIRUBIN, URINE: NEGATIVE
POC BLOOD, URINE: NEGATIVE
POC COLOR, URINE: YELLOW
POC GLUCOSE, URINE: NEGATIVE MG/DL
POC KETONES, URINE: NEGATIVE MG/DL
POC LEUKOCYTES, URINE: ABNORMAL
POC NITRITE,URINE: NEGATIVE
POC PH, URINE: 7 PH
POC PROTEIN, URINE: NEGATIVE MG/DL
POC SPECIFIC GRAVITY, URINE: 1.01
POC UROBILINOGEN, URINE: 0.2 EU/DL
RBC # BLD AUTO: 4.34 X10*6/UL (ref 4–5.2)
RH FACTOR (ANTIGEN D): NORMAL
WBC # BLD AUTO: 8.7 X10*3/UL (ref 4.4–11.3)

## 2023-12-04 PROCEDURE — 99213 OFFICE O/P EST LOW 20 MIN: CPT | Mod: 25 | Performed by: NURSE PRACTITIONER

## 2023-12-04 PROCEDURE — 76819 FETAL BIOPHYS PROFIL W/O NST: CPT

## 2023-12-04 PROCEDURE — 86900 BLOOD TYPING SEROLOGIC ABO: CPT

## 2023-12-04 PROCEDURE — 99213 OFFICE O/P EST LOW 20 MIN: CPT | Performed by: NURSE PRACTITIONER

## 2023-12-04 PROCEDURE — 86901 BLOOD TYPING SEROLOGIC RH(D): CPT

## 2023-12-04 PROCEDURE — 81003 URINALYSIS AUTO W/O SCOPE: CPT | Performed by: NURSE PRACTITIONER

## 2023-12-04 PROCEDURE — 85025 COMPLETE CBC W/AUTO DIFF WBC: CPT

## 2023-12-04 PROCEDURE — 36415 COLL VENOUS BLD VENIPUNCTURE: CPT

## 2023-12-04 PROCEDURE — 76819 FETAL BIOPHYS PROFIL W/O NST: CPT | Performed by: OBSTETRICS & GYNECOLOGY

## 2023-12-04 PROCEDURE — 86850 RBC ANTIBODY SCREEN: CPT

## 2023-12-04 NOTE — PROGRESS NOTES
Raegan Ozuna is a 37 y.o. at 37w5d here for F/U prenatal visit with [facility]    Her pregnancy is complicated by:   GDM  Obesity   H/O CD   Complicated UTI in early pregnancy     Overall she reports  she is feeling well. Feeling +FM. Denies VB, LOF, or CTXs.     Concerns today: no new concerns    BG log reviewed. Fasting of 60 yesterday and PP dinner was 160. Fasting this morning was 94. No changes to insulin plan.     Visit Vitals  /78   Wt 99.3 kg (219 lb)   LMP 03/14/2023   BMI 38.79 kg/m²   OB Status Pregnant   Smoking Status Never   BSA 2.1 m²      Gen-NAD  Cardiac- good peripheral perfusion  Respiratory- non-labored breathing  Abdomen- soft, non-tender, gravid   Extremities- symmetrical   Pelvic- deferred      Sono- to follow appointment    Problem List Items Addressed This Visit          Pregnancy    Excessive fetal growth affecting management of pregnancy in third trimester - Primary    Overview     -Previously counseled on risk   -Already planning for rCD - discussed delivery at 38 wks, pt agreeable. Will task RN to schedule. Will plan for 38.1 with Dr. Nair and this CNP to assist- scheduled for 12/6            Gestational diabetes mellitus (GDM) in third trimester    Overview     -ROXANNA to [facility] (10/30)  -1hr-138 (9/26), 3hr (10/5)- fasting 80, 1hr 192, 2hr 161, 3hr 99  - Attended Boot Camp  - [facility] Consult completed 10/16  -Weekly communication with blood sugar line  -Serial growth ultrasounds starting at 28 weeks    Last growth 11/6: EFW 96%, AC >99%, BPP 8/8, normal MANUEL    11/20- BPP 8/8, normal MANUEL, MVP 8.7     Fetal surveillance if on medications, LGA, poly, or uncontrolled/level of control unknown:    -Twice weekly now until delivery    Current Regimen: Insulin initiated 10/23 at bedtime for elevated fasting levels ; Current regimen NPH 14iu at bedtime, no changes today with BG log review - more than 80% within goal range     Delivery Plan: 38.1- 12/6 with Dr. Nair   Intrapartum:  GDM  protocol  Postpartum: No medication    Recommend PP 2hr gtt and q3yr F/U with PCP for A1C and TSH           Obesity in pregnancy    Overview     -Previously counseled  -IOM weight gain guidelines 11-20 lbs   - testing twice weekly now  -See GDM dx              Supervision of high risk pregnancy in third trimester    Overview     -Was shared care with Dr. Tilley--> ROXANNA to [facility] at time of MFM consult   -Weekly communication with blood sugar line  -Weekly  testing, increase to twice weekly at 36 wks   -GBS not needed   -s/p Tdap   -s/p flu     -PNV weekly   -Plan for delivery at 38.1 wks - rCD with Dr. Nair and this CNP to assist ,              Relevant Orders    POC Urine Dip (Completed)    Urinary tract infection in mother during third trimester of pregnancy    Overview     -Hospitalized in early pregnancy and treated for sepsis with readmission a few days later as symptoms returned, GBS + urine, ? Pyelonephritis  -On ppx macrobid daily now  -Treat for GBS + at time of delivery          Uterine scar from previous  delivery    Overview     -CD x2  -Plans rCD   -Delivering at [facility] at 38 wks          Relevant Orders    Type And Screen    CBC and Auto Differential          Plan for rCD on    Pt to get labs today   US to follow appointment this morning   GBS + urine early in pregnancy       MADISON Porras-CNP

## 2023-12-06 ENCOUNTER — ANESTHESIA EVENT (OUTPATIENT)
Dept: OBSTETRICS AND GYNECOLOGY | Facility: HOSPITAL | Age: 38
End: 2023-12-06
Payer: COMMERCIAL

## 2023-12-06 ENCOUNTER — HOSPITAL ENCOUNTER (INPATIENT)
Facility: HOSPITAL | Age: 38
LOS: 4 days | Discharge: HOME | End: 2023-12-10
Attending: STUDENT IN AN ORGANIZED HEALTH CARE EDUCATION/TRAINING PROGRAM | Admitting: OBSTETRICS & GYNECOLOGY
Payer: COMMERCIAL

## 2023-12-06 ENCOUNTER — ANESTHESIA (OUTPATIENT)
Dept: OBSTETRICS AND GYNECOLOGY | Facility: HOSPITAL | Age: 38
End: 2023-12-06
Payer: COMMERCIAL

## 2023-12-06 DIAGNOSIS — N85.A UTERINE SCAR FROM PREVIOUS CESAREAN DELIVERY: ICD-10-CM

## 2023-12-06 DIAGNOSIS — Z3A.38 38 WEEKS GESTATION OF PREGNANCY (HHS-HCC): Primary | ICD-10-CM

## 2023-12-06 DIAGNOSIS — O24.414 INSULIN CONTROLLED GESTATIONAL DIABETES MELLITUS (GDM) IN THIRD TRIMESTER (HHS-HCC): ICD-10-CM

## 2023-12-06 DIAGNOSIS — Z98.890 POST-OPERATIVE STATE: ICD-10-CM

## 2023-12-06 DIAGNOSIS — O36.60X0 EXCESSIVE FETAL GROWTH AFFECTING MANAGEMENT OF PREGNANCY, ANTEPARTUM, SINGLE OR UNSPECIFIED FETUS (HHS-HCC): ICD-10-CM

## 2023-12-06 PROBLEM — K21.9 GASTROESOPHAGEAL REFLUX DISEASE WITHOUT ESOPHAGITIS: Status: ACTIVE | Noted: 2023-12-06

## 2023-12-06 PROBLEM — R11.2 PONV (POSTOPERATIVE NAUSEA AND VOMITING): Status: ACTIVE | Noted: 2023-12-06

## 2023-12-06 LAB
ABO GROUP (TYPE) IN BLOOD: NORMAL
ANTIBODY SCREEN: NORMAL
ERYTHROCYTE [DISTWIDTH] IN BLOOD BY AUTOMATED COUNT: 13.4 % (ref 11.5–14.5)
GLUCOSE BLD MANUAL STRIP-MCNC: 93 MG/DL (ref 74–99)
HCT VFR BLD AUTO: 38.9 % (ref 36–46)
HGB BLD-MCNC: 13.1 G/DL (ref 12–16)
MCH RBC QN AUTO: 29.3 PG (ref 26–34)
MCHC RBC AUTO-ENTMCNC: 33.7 G/DL (ref 32–36)
MCV RBC AUTO: 87 FL (ref 80–100)
NRBC BLD-RTO: 0 /100 WBCS (ref 0–0)
PLATELET # BLD AUTO: 209 X10*3/UL (ref 150–450)
RBC # BLD AUTO: 4.47 X10*6/UL (ref 4–5.2)
RH FACTOR (ANTIGEN D): NORMAL
T PALLIDUM AB SER QL: NONREACTIVE
WBC # BLD AUTO: 9.8 X10*3/UL (ref 4.4–11.3)

## 2023-12-06 PROCEDURE — 3700000014 HC AN EPIDURAL BLOCK CHARGE: Performed by: OBSTETRICS & GYNECOLOGY

## 2023-12-06 PROCEDURE — 86920 COMPATIBILITY TEST SPIN: CPT

## 2023-12-06 PROCEDURE — 76937 US GUIDE VASCULAR ACCESS: CPT

## 2023-12-06 PROCEDURE — 59515 CESAREAN DELIVERY: CPT | Performed by: OBSTETRICS & GYNECOLOGY

## 2023-12-06 PROCEDURE — 2500000004 HC RX 250 GENERAL PHARMACY W/ HCPCS (ALT 636 FOR OP/ED): Performed by: OBSTETRICS & GYNECOLOGY

## 2023-12-06 PROCEDURE — 88307 TISSUE EXAM BY PATHOLOGIST: CPT | Mod: TC,SUR | Performed by: NURSE PRACTITIONER

## 2023-12-06 PROCEDURE — 82947 ASSAY GLUCOSE BLOOD QUANT: CPT

## 2023-12-06 PROCEDURE — 01961 ANES CESAREAN DELIVERY ONLY: CPT | Performed by: STUDENT IN AN ORGANIZED HEALTH CARE EDUCATION/TRAINING PROGRAM

## 2023-12-06 PROCEDURE — 2500000004 HC RX 250 GENERAL PHARMACY W/ HCPCS (ALT 636 FOR OP/ED): Performed by: STUDENT IN AN ORGANIZED HEALTH CARE EDUCATION/TRAINING PROGRAM

## 2023-12-06 PROCEDURE — 86901 BLOOD TYPING SEROLOGIC RH(D): CPT | Performed by: NURSE PRACTITIONER

## 2023-12-06 PROCEDURE — 85027 COMPLETE CBC AUTOMATED: CPT | Performed by: OBSTETRICS & GYNECOLOGY

## 2023-12-06 PROCEDURE — 86780 TREPONEMA PALLIDUM: CPT | Performed by: STUDENT IN AN ORGANIZED HEALTH CARE EDUCATION/TRAINING PROGRAM

## 2023-12-06 PROCEDURE — 2500000005 HC RX 250 GENERAL PHARMACY W/O HCPCS: Performed by: NURSE ANESTHETIST, CERTIFIED REGISTERED

## 2023-12-06 PROCEDURE — 2500000001 HC RX 250 WO HCPCS SELF ADMINISTERED DRUGS (ALT 637 FOR MEDICARE OP): Performed by: OBSTETRICS & GYNECOLOGY

## 2023-12-06 PROCEDURE — 7100000016 HC LABOR RECOVERY PER HOUR: Performed by: OBSTETRICS & GYNECOLOGY

## 2023-12-06 PROCEDURE — 2500000001 HC RX 250 WO HCPCS SELF ADMINISTERED DRUGS (ALT 637 FOR MEDICARE OP): Performed by: NURSE ANESTHETIST, CERTIFIED REGISTERED

## 2023-12-06 PROCEDURE — 1210000001 HC SEMI-PRIVATE ROOM DAILY

## 2023-12-06 PROCEDURE — 36415 COLL VENOUS BLD VENIPUNCTURE: CPT | Performed by: STUDENT IN AN ORGANIZED HEALTH CARE EDUCATION/TRAINING PROGRAM

## 2023-12-06 PROCEDURE — 85027 COMPLETE CBC AUTOMATED: CPT | Performed by: NURSE PRACTITIONER

## 2023-12-06 PROCEDURE — 2500000004 HC RX 250 GENERAL PHARMACY W/ HCPCS (ALT 636 FOR OP/ED): Performed by: NURSE ANESTHETIST, CERTIFIED REGISTERED

## 2023-12-06 PROCEDURE — 36415 COLL VENOUS BLD VENIPUNCTURE: CPT | Performed by: NURSE PRACTITIONER

## 2023-12-06 PROCEDURE — 3700000018 HC OB ANESTHESIA C-SECTION: Performed by: OBSTETRICS & GYNECOLOGY

## 2023-12-06 PROCEDURE — 59514 CESAREAN DELIVERY ONLY: CPT | Performed by: OBSTETRICS & GYNECOLOGY

## 2023-12-06 PROCEDURE — 01961 ANES CESAREAN DELIVERY ONLY: CPT | Performed by: NURSE ANESTHETIST, CERTIFIED REGISTERED

## 2023-12-06 PROCEDURE — 88307 TISSUE EXAM BY PATHOLOGIST: CPT | Performed by: PATHOLOGY

## 2023-12-06 RX ORDER — TERBUTALINE SULFATE 1 MG/ML
0.25 INJECTION SUBCUTANEOUS ONCE AS NEEDED
Status: CANCELLED | OUTPATIENT
Start: 2023-12-06

## 2023-12-06 RX ORDER — KETOROLAC TROMETHAMINE 30 MG/ML
30 INJECTION, SOLUTION INTRAMUSCULAR; INTRAVENOUS EVERY 6 HOURS
Status: COMPLETED | OUTPATIENT
Start: 2023-12-06 | End: 2023-12-07

## 2023-12-06 RX ORDER — OXYCODONE HYDROCHLORIDE 5 MG/1
5 TABLET ORAL EVERY 4 HOURS PRN
Status: DISCONTINUED | OUTPATIENT
Start: 2023-12-06 | End: 2023-12-10 | Stop reason: HOSPADM

## 2023-12-06 RX ORDER — CEFAZOLIN 1 G/1
INJECTION, POWDER, FOR SOLUTION INTRAVENOUS AS NEEDED
Status: DISCONTINUED | OUTPATIENT
Start: 2023-12-06 | End: 2023-12-06

## 2023-12-06 RX ORDER — OXYTOCIN/0.9 % SODIUM CHLORIDE 30/500 ML
60 PLASTIC BAG, INJECTION (ML) INTRAVENOUS ONCE AS NEEDED
Status: CANCELLED | OUTPATIENT
Start: 2023-12-06

## 2023-12-06 RX ORDER — ONDANSETRON HYDROCHLORIDE 2 MG/ML
4 INJECTION, SOLUTION INTRAVENOUS EVERY 6 HOURS PRN
Status: CANCELLED | OUTPATIENT
Start: 2023-12-06

## 2023-12-06 RX ORDER — LABETALOL HYDROCHLORIDE 5 MG/ML
20 INJECTION, SOLUTION INTRAVENOUS ONCE AS NEEDED
Status: DISCONTINUED | OUTPATIENT
Start: 2023-12-06 | End: 2023-12-06

## 2023-12-06 RX ORDER — PHENYLEPHRINE HCL IN 0.9% NACL 0.4MG/10ML
SYRINGE (ML) INTRAVENOUS AS NEEDED
Status: DISCONTINUED | OUTPATIENT
Start: 2023-12-06 | End: 2023-12-06

## 2023-12-06 RX ORDER — KETOROLAC TROMETHAMINE 30 MG/ML
INJECTION, SOLUTION INTRAMUSCULAR; INTRAVENOUS AS NEEDED
Status: DISCONTINUED | OUTPATIENT
Start: 2023-12-06 | End: 2023-12-06

## 2023-12-06 RX ORDER — CARBOPROST TROMETHAMINE 250 UG/ML
250 INJECTION, SOLUTION INTRAMUSCULAR ONCE AS NEEDED
Status: CANCELLED | OUTPATIENT
Start: 2023-12-06

## 2023-12-06 RX ORDER — ENOXAPARIN SODIUM 100 MG/ML
40 INJECTION SUBCUTANEOUS EVERY 24 HOURS
Status: DISCONTINUED | OUTPATIENT
Start: 2023-12-07 | End: 2023-12-10 | Stop reason: HOSPADM

## 2023-12-06 RX ORDER — PROPOFOL 10 MG/ML
INJECTION, EMULSION INTRAVENOUS AS NEEDED
Status: DISCONTINUED | OUTPATIENT
Start: 2023-12-06 | End: 2023-12-06

## 2023-12-06 RX ORDER — OXYTOCIN/0.9 % SODIUM CHLORIDE 30/500 ML
60 PLASTIC BAG, INJECTION (ML) INTRAVENOUS ONCE AS NEEDED
Status: DISCONTINUED | OUTPATIENT
Start: 2023-12-06 | End: 2023-12-06

## 2023-12-06 RX ORDER — METOCLOPRAMIDE HYDROCHLORIDE 5 MG/ML
10 INJECTION INTRAMUSCULAR; INTRAVENOUS EVERY 6 HOURS PRN
Status: CANCELLED | OUTPATIENT
Start: 2023-12-06

## 2023-12-06 RX ORDER — LOPERAMIDE HYDROCHLORIDE 2 MG/1
4 CAPSULE ORAL EVERY 2 HOUR PRN
Status: CANCELLED | OUTPATIENT
Start: 2023-12-06

## 2023-12-06 RX ORDER — BISACODYL 10 MG/1
10 SUPPOSITORY RECTAL DAILY PRN
Status: DISCONTINUED | OUTPATIENT
Start: 2023-12-06 | End: 2023-12-10 | Stop reason: HOSPADM

## 2023-12-06 RX ORDER — IBUPROFEN 600 MG/1
600 TABLET ORAL EVERY 6 HOURS
Status: DISCONTINUED | OUTPATIENT
Start: 2023-12-07 | End: 2023-12-10 | Stop reason: HOSPADM

## 2023-12-06 RX ORDER — HYDROMORPHONE HYDROCHLORIDE 1 MG/ML
INJECTION, SOLUTION INTRAMUSCULAR; INTRAVENOUS; SUBCUTANEOUS AS NEEDED
Status: DISCONTINUED | OUTPATIENT
Start: 2023-12-06 | End: 2023-12-06

## 2023-12-06 RX ORDER — DIPHENHYDRAMINE HCL 25 MG
25 CAPSULE ORAL EVERY 4 HOURS PRN
Status: DISCONTINUED | OUTPATIENT
Start: 2023-12-06 | End: 2023-12-10 | Stop reason: HOSPADM

## 2023-12-06 RX ORDER — MISOPROSTOL 200 UG/1
800 TABLET ORAL ONCE AS NEEDED
Status: DISCONTINUED | OUTPATIENT
Start: 2023-12-06 | End: 2023-12-10 | Stop reason: HOSPADM

## 2023-12-06 RX ORDER — LOPERAMIDE HYDROCHLORIDE 2 MG/1
4 CAPSULE ORAL EVERY 2 HOUR PRN
Status: DISCONTINUED | OUTPATIENT
Start: 2023-12-06 | End: 2023-12-06

## 2023-12-06 RX ORDER — OXYTOCIN/0.9 % SODIUM CHLORIDE 30/500 ML
60 PLASTIC BAG, INJECTION (ML) INTRAVENOUS ONCE AS NEEDED
Status: DISCONTINUED | OUTPATIENT
Start: 2023-12-06 | End: 2023-12-10 | Stop reason: HOSPADM

## 2023-12-06 RX ORDER — MISOPROSTOL 200 UG/1
800 TABLET ORAL ONCE AS NEEDED
Status: CANCELLED | OUTPATIENT
Start: 2023-12-06

## 2023-12-06 RX ORDER — ADHESIVE BANDAGE
10 BANDAGE TOPICAL
Status: DISCONTINUED | OUTPATIENT
Start: 2023-12-06 | End: 2023-12-10 | Stop reason: HOSPADM

## 2023-12-06 RX ORDER — TRANEXAMIC ACID 100 MG/ML
1000 INJECTION, SOLUTION INTRAVENOUS ONCE AS NEEDED
Status: CANCELLED | OUTPATIENT
Start: 2023-12-06

## 2023-12-06 RX ORDER — ONDANSETRON 4 MG/1
4 TABLET, FILM COATED ORAL EVERY 6 HOURS PRN
Status: CANCELLED | OUTPATIENT
Start: 2023-12-06

## 2023-12-06 RX ORDER — CARBOPROST TROMETHAMINE 250 UG/ML
250 INJECTION, SOLUTION INTRAMUSCULAR ONCE AS NEEDED
Status: DISCONTINUED | OUTPATIENT
Start: 2023-12-06 | End: 2023-12-10 | Stop reason: HOSPADM

## 2023-12-06 RX ORDER — OXYTOCIN 10 [USP'U]/ML
10 INJECTION, SOLUTION INTRAMUSCULAR; INTRAVENOUS ONCE AS NEEDED
Status: DISCONTINUED | OUTPATIENT
Start: 2023-12-06 | End: 2023-12-10 | Stop reason: HOSPADM

## 2023-12-06 RX ORDER — HYDROMORPHONE HYDROCHLORIDE 1 MG/ML
0.2 INJECTION, SOLUTION INTRAMUSCULAR; INTRAVENOUS; SUBCUTANEOUS EVERY 5 MIN PRN
Status: DISCONTINUED | OUTPATIENT
Start: 2023-12-06 | End: 2023-12-10 | Stop reason: HOSPADM

## 2023-12-06 RX ORDER — METHYLERGONOVINE MALEATE 0.2 MG/ML
0.2 INJECTION INTRAVENOUS ONCE AS NEEDED
Status: DISCONTINUED | OUTPATIENT
Start: 2023-12-06 | End: 2023-12-06

## 2023-12-06 RX ORDER — SODIUM CHLORIDE, SODIUM LACTATE, POTASSIUM CHLORIDE, CALCIUM CHLORIDE 600; 310; 30; 20 MG/100ML; MG/100ML; MG/100ML; MG/100ML
125 INJECTION, SOLUTION INTRAVENOUS CONTINUOUS
Status: CANCELLED | OUTPATIENT
Start: 2023-12-06

## 2023-12-06 RX ORDER — TRANEXAMIC ACID 100 MG/ML
1000 INJECTION, SOLUTION INTRAVENOUS ONCE AS NEEDED
Status: DISCONTINUED | OUTPATIENT
Start: 2023-12-06 | End: 2023-12-06

## 2023-12-06 RX ORDER — HYDRALAZINE HYDROCHLORIDE 20 MG/ML
5 INJECTION INTRAMUSCULAR; INTRAVENOUS ONCE AS NEEDED
Status: DISCONTINUED | OUTPATIENT
Start: 2023-12-06 | End: 2023-12-10 | Stop reason: HOSPADM

## 2023-12-06 RX ORDER — LIDOCAINE 560 MG/1
1 PATCH PERCUTANEOUS; TOPICAL; TRANSDERMAL
Status: DISCONTINUED | OUTPATIENT
Start: 2023-12-06 | End: 2023-12-10 | Stop reason: HOSPADM

## 2023-12-06 RX ORDER — ONDANSETRON 4 MG/1
4 TABLET, FILM COATED ORAL EVERY 6 HOURS PRN
Status: DISCONTINUED | OUTPATIENT
Start: 2023-12-06 | End: 2023-12-06

## 2023-12-06 RX ORDER — METHYLERGONOVINE MALEATE 0.2 MG/ML
0.2 INJECTION INTRAVENOUS ONCE AS NEEDED
Status: DISCONTINUED | OUTPATIENT
Start: 2023-12-06 | End: 2023-12-10 | Stop reason: HOSPADM

## 2023-12-06 RX ORDER — BUPIVACAINE HYDROCHLORIDE 7.5 MG/ML
INJECTION, SOLUTION INTRASPINAL AS NEEDED
Status: DISCONTINUED | OUTPATIENT
Start: 2023-12-06 | End: 2023-12-06

## 2023-12-06 RX ORDER — DEXAMETHASONE SODIUM PHOSPHATE 4 MG/ML
INJECTION, SOLUTION INTRA-ARTICULAR; INTRALESIONAL; INTRAMUSCULAR; INTRAVENOUS; SOFT TISSUE AS NEEDED
Status: DISCONTINUED | OUTPATIENT
Start: 2023-12-06 | End: 2023-12-06

## 2023-12-06 RX ORDER — FENTANYL CITRATE 50 UG/ML
INJECTION, SOLUTION INTRAMUSCULAR; INTRAVENOUS AS NEEDED
Status: DISCONTINUED | OUTPATIENT
Start: 2023-12-06 | End: 2023-12-06

## 2023-12-06 RX ORDER — METOCLOPRAMIDE 10 MG/1
10 TABLET ORAL EVERY 6 HOURS PRN
Status: DISCONTINUED | OUTPATIENT
Start: 2023-12-06 | End: 2023-12-06

## 2023-12-06 RX ORDER — POLYETHYLENE GLYCOL 3350 17 G/17G
17 POWDER, FOR SOLUTION ORAL 2 TIMES DAILY PRN
Status: DISCONTINUED | OUTPATIENT
Start: 2023-12-06 | End: 2023-12-10 | Stop reason: HOSPADM

## 2023-12-06 RX ORDER — NALBUPHINE HYDROCHLORIDE 10 MG/ML
5 INJECTION, SOLUTION INTRAMUSCULAR; INTRAVENOUS; SUBCUTANEOUS
Status: ACTIVE | OUTPATIENT
Start: 2023-12-06 | End: 2023-12-07

## 2023-12-06 RX ORDER — ONDANSETRON 4 MG/1
4 TABLET, FILM COATED ORAL EVERY 6 HOURS PRN
Status: DISCONTINUED | OUTPATIENT
Start: 2023-12-06 | End: 2023-12-10 | Stop reason: HOSPADM

## 2023-12-06 RX ORDER — ONDANSETRON HYDROCHLORIDE 2 MG/ML
4 INJECTION, SOLUTION INTRAVENOUS EVERY 6 HOURS PRN
Status: DISCONTINUED | OUTPATIENT
Start: 2023-12-06 | End: 2023-12-10 | Stop reason: HOSPADM

## 2023-12-06 RX ORDER — LABETALOL HYDROCHLORIDE 5 MG/ML
20 INJECTION, SOLUTION INTRAVENOUS ONCE AS NEEDED
Status: CANCELLED | OUTPATIENT
Start: 2023-12-06

## 2023-12-06 RX ORDER — NIFEDIPINE 10 MG/1
10 CAPSULE ORAL ONCE AS NEEDED
Status: DISCONTINUED | OUTPATIENT
Start: 2023-12-06 | End: 2023-12-06

## 2023-12-06 RX ORDER — NALOXONE HYDROCHLORIDE 0.4 MG/ML
0.1 INJECTION, SOLUTION INTRAMUSCULAR; INTRAVENOUS; SUBCUTANEOUS EVERY 5 MIN PRN
Status: DISCONTINUED | OUTPATIENT
Start: 2023-12-06 | End: 2023-12-10 | Stop reason: HOSPADM

## 2023-12-06 RX ORDER — PHENYLEPHRINE 10 MG/250 ML(40 MCG/ML)IN 0.9 % SOD.CHLORIDE INTRAVENOUS
CONTINUOUS PRN
Status: DISCONTINUED | OUTPATIENT
Start: 2023-12-06 | End: 2023-12-06

## 2023-12-06 RX ORDER — NIFEDIPINE 10 MG/1
10 CAPSULE ORAL ONCE AS NEEDED
Status: DISCONTINUED | OUTPATIENT
Start: 2023-12-06 | End: 2023-12-10 | Stop reason: HOSPADM

## 2023-12-06 RX ORDER — LIDOCAINE HYDROCHLORIDE 10 MG/ML
30 INJECTION INFILTRATION; PERINEURAL ONCE AS NEEDED
Status: CANCELLED | OUTPATIENT
Start: 2023-12-06

## 2023-12-06 RX ORDER — TRANEXAMIC ACID 100 MG/ML
1000 INJECTION, SOLUTION INTRAVENOUS ONCE AS NEEDED
Status: DISCONTINUED | OUTPATIENT
Start: 2023-12-06 | End: 2023-12-10 | Stop reason: HOSPADM

## 2023-12-06 RX ORDER — OXYTOCIN 10 [USP'U]/ML
10 INJECTION, SOLUTION INTRAMUSCULAR; INTRAVENOUS ONCE AS NEEDED
Status: CANCELLED | OUTPATIENT
Start: 2023-12-06

## 2023-12-06 RX ORDER — SIMETHICONE 80 MG
80 TABLET,CHEWABLE ORAL 4 TIMES DAILY PRN
Status: DISCONTINUED | OUTPATIENT
Start: 2023-12-06 | End: 2023-12-10 | Stop reason: HOSPADM

## 2023-12-06 RX ORDER — CARBOPROST TROMETHAMINE 250 UG/ML
250 INJECTION, SOLUTION INTRAMUSCULAR ONCE AS NEEDED
Status: DISCONTINUED | OUTPATIENT
Start: 2023-12-06 | End: 2023-12-06

## 2023-12-06 RX ORDER — SUCCINYLCHOLINE CHLORIDE 20 MG/ML
INJECTION INTRAMUSCULAR; INTRAVENOUS AS NEEDED
Status: DISCONTINUED | OUTPATIENT
Start: 2023-12-06 | End: 2023-12-06

## 2023-12-06 RX ORDER — METOCLOPRAMIDE HYDROCHLORIDE 5 MG/ML
10 INJECTION INTRAMUSCULAR; INTRAVENOUS EVERY 6 HOURS PRN
Status: DISCONTINUED | OUTPATIENT
Start: 2023-12-06 | End: 2023-12-06

## 2023-12-06 RX ORDER — METOCLOPRAMIDE 10 MG/1
10 TABLET ORAL EVERY 6 HOURS PRN
Status: CANCELLED | OUTPATIENT
Start: 2023-12-06

## 2023-12-06 RX ORDER — FAMOTIDINE 10 MG/ML
INJECTION INTRAVENOUS AS NEEDED
Status: DISCONTINUED | OUTPATIENT
Start: 2023-12-06 | End: 2023-12-06

## 2023-12-06 RX ORDER — METHYLERGONOVINE MALEATE 0.2 MG/ML
0.2 INJECTION INTRAVENOUS ONCE AS NEEDED
Status: CANCELLED | OUTPATIENT
Start: 2023-12-06

## 2023-12-06 RX ORDER — HYDRALAZINE HYDROCHLORIDE 20 MG/ML
5 INJECTION INTRAMUSCULAR; INTRAVENOUS ONCE AS NEEDED
Status: CANCELLED | OUTPATIENT
Start: 2023-12-06

## 2023-12-06 RX ORDER — OXYTOCIN 10 [USP'U]/ML
10 INJECTION, SOLUTION INTRAMUSCULAR; INTRAVENOUS ONCE AS NEEDED
Status: DISCONTINUED | OUTPATIENT
Start: 2023-12-06 | End: 2023-12-06

## 2023-12-06 RX ORDER — MISOPROSTOL 200 UG/1
800 TABLET ORAL ONCE AS NEEDED
Status: DISCONTINUED | OUTPATIENT
Start: 2023-12-06 | End: 2023-12-06

## 2023-12-06 RX ORDER — LOPERAMIDE HYDROCHLORIDE 2 MG/1
4 CAPSULE ORAL EVERY 2 HOUR PRN
Status: DISCONTINUED | OUTPATIENT
Start: 2023-12-06 | End: 2023-12-10 | Stop reason: HOSPADM

## 2023-12-06 RX ORDER — NIFEDIPINE 10 MG/1
10 CAPSULE ORAL ONCE AS NEEDED
Status: CANCELLED | OUTPATIENT
Start: 2023-12-06

## 2023-12-06 RX ORDER — OXYCODONE HYDROCHLORIDE 5 MG/1
10 TABLET ORAL EVERY 4 HOURS PRN
Status: DISCONTINUED | OUTPATIENT
Start: 2023-12-06 | End: 2023-12-10 | Stop reason: HOSPADM

## 2023-12-06 RX ORDER — FENTANYL/BUPIVACAINE/NS/PF 2MCG/ML-.1
0-54 PLASTIC BAG, INJECTION (ML) INJECTION CONTINUOUS
Status: DISCONTINUED | OUTPATIENT
Start: 2023-12-06 | End: 2023-12-06

## 2023-12-06 RX ORDER — LIDOCAINE HYDROCHLORIDE 10 MG/ML
30 INJECTION INFILTRATION; PERINEURAL ONCE AS NEEDED
Status: DISCONTINUED | OUTPATIENT
Start: 2023-12-06 | End: 2023-12-06

## 2023-12-06 RX ORDER — ACETAMINOPHEN 325 MG/1
975 TABLET ORAL EVERY 6 HOURS
Status: DISCONTINUED | OUTPATIENT
Start: 2023-12-06 | End: 2023-12-10 | Stop reason: HOSPADM

## 2023-12-06 RX ORDER — HYDRALAZINE HYDROCHLORIDE 20 MG/ML
5 INJECTION INTRAMUSCULAR; INTRAVENOUS ONCE AS NEEDED
Status: DISCONTINUED | OUTPATIENT
Start: 2023-12-06 | End: 2023-12-06

## 2023-12-06 RX ORDER — ACETAMINOPHEN 120 MG/1
SUPPOSITORY RECTAL AS NEEDED
Status: DISCONTINUED | OUTPATIENT
Start: 2023-12-06 | End: 2023-12-06

## 2023-12-06 RX ORDER — ONDANSETRON HYDROCHLORIDE 2 MG/ML
4 INJECTION, SOLUTION INTRAVENOUS EVERY 6 HOURS PRN
Status: DISCONTINUED | OUTPATIENT
Start: 2023-12-06 | End: 2023-12-06

## 2023-12-06 RX ORDER — LABETALOL HYDROCHLORIDE 5 MG/ML
20 INJECTION, SOLUTION INTRAVENOUS ONCE AS NEEDED
Status: DISCONTINUED | OUTPATIENT
Start: 2023-12-06 | End: 2023-12-10 | Stop reason: HOSPADM

## 2023-12-06 RX ORDER — SODIUM CHLORIDE, SODIUM LACTATE, POTASSIUM CHLORIDE, CALCIUM CHLORIDE 600; 310; 30; 20 MG/100ML; MG/100ML; MG/100ML; MG/100ML
125 INJECTION, SOLUTION INTRAVENOUS CONTINUOUS
Status: DISCONTINUED | OUTPATIENT
Start: 2023-12-06 | End: 2023-12-09

## 2023-12-06 RX ORDER — TERBUTALINE SULFATE 1 MG/ML
0.25 INJECTION SUBCUTANEOUS ONCE AS NEEDED
Status: DISCONTINUED | OUTPATIENT
Start: 2023-12-06 | End: 2023-12-06

## 2023-12-06 RX ORDER — DIPHENHYDRAMINE HYDROCHLORIDE 50 MG/ML
25 INJECTION INTRAMUSCULAR; INTRAVENOUS EVERY 4 HOURS PRN
Status: DISCONTINUED | OUTPATIENT
Start: 2023-12-06 | End: 2023-12-10 | Stop reason: HOSPADM

## 2023-12-06 RX ADMIN — HYDROMORPHONE HYDROCHLORIDE 0.2 MG: 1 INJECTION, SOLUTION INTRAMUSCULAR; INTRAVENOUS; SUBCUTANEOUS at 13:33

## 2023-12-06 RX ADMIN — ONDANSETRON 4 MG: 2 INJECTION INTRAMUSCULAR; INTRAVENOUS at 11:11

## 2023-12-06 RX ADMIN — HYDROMORPHONE HYDROCHLORIDE 0.4 MG: 1 INJECTION, SOLUTION INTRAMUSCULAR; INTRAVENOUS; SUBCUTANEOUS at 11:03

## 2023-12-06 RX ADMIN — ONDANSETRON 4 MG: 2 INJECTION INTRAMUSCULAR; INTRAVENOUS at 09:30

## 2023-12-06 RX ADMIN — OXYTOCIN 600 MILLI-UNITS/MIN: 10 INJECTION, SOLUTION INTRAMUSCULAR; INTRAVENOUS at 10:37

## 2023-12-06 RX ADMIN — FAMOTIDINE 20 MG: 10 INJECTION, SOLUTION INTRAVENOUS at 09:29

## 2023-12-06 RX ADMIN — KETOROLAC TROMETHAMINE 30 MG: 30 INJECTION, SOLUTION INTRAMUSCULAR; INTRAVENOUS at 23:20

## 2023-12-06 RX ADMIN — KETOROLAC TROMETHAMINE 30 MG: 30 INJECTION, SOLUTION INTRAMUSCULAR; INTRAVENOUS at 11:16

## 2023-12-06 RX ADMIN — DEXMEDETOMIDINE HYDROCHLORIDE 4 MCG: 100 INJECTION, SOLUTION INTRAVENOUS at 10:44

## 2023-12-06 RX ADMIN — PROPOFOL 190 MG: 10 INJECTION, EMULSION INTRAVENOUS at 10:51

## 2023-12-06 RX ADMIN — ACETAMINOPHEN 975 MG: 325 TABLET ORAL at 23:19

## 2023-12-06 RX ADMIN — SODIUM CHLORIDE, SODIUM LACTATE, POTASSIUM CHLORIDE, AND CALCIUM CHLORIDE: 600; 310; 30; 20 INJECTION, SOLUTION INTRAVENOUS at 09:30

## 2023-12-06 RX ADMIN — ACETAMINOPHEN 650 MG: 120 SUPPOSITORY RECTAL at 11:17

## 2023-12-06 RX ADMIN — Medication 80 MCG: at 11:13

## 2023-12-06 RX ADMIN — HYDROMORPHONE HYDROCHLORIDE 0.2 MG: 1 INJECTION, SOLUTION INTRAMUSCULAR; INTRAVENOUS; SUBCUTANEOUS at 12:34

## 2023-12-06 RX ADMIN — CEFAZOLIN 2 G: 330 INJECTION, POWDER, FOR SOLUTION INTRAMUSCULAR; INTRAVENOUS at 09:47

## 2023-12-06 RX ADMIN — DEXMEDETOMIDINE HYDROCHLORIDE 4 MCG: 100 INJECTION, SOLUTION INTRAVENOUS at 10:40

## 2023-12-06 RX ADMIN — PROPOFOL 10 MG: 10 INJECTION, EMULSION INTRAVENOUS at 10:41

## 2023-12-06 RX ADMIN — HYDROMORPHONE HYDROCHLORIDE 0.2 MG: 1 INJECTION, SOLUTION INTRAMUSCULAR; INTRAVENOUS; SUBCUTANEOUS at 12:52

## 2023-12-06 RX ADMIN — BUPIVACAINE HYDROCHLORIDE IN DEXTROSE 1.7 ML: 7.5 INJECTION, SOLUTION SUBARACHNOID at 09:55

## 2023-12-06 RX ADMIN — HYDROMORPHONE HYDROCHLORIDE 0.3 MG: 1 INJECTION, SOLUTION INTRAMUSCULAR; INTRAVENOUS; SUBCUTANEOUS at 11:09

## 2023-12-06 RX ADMIN — ACETAMINOPHEN 975 MG: 325 TABLET ORAL at 17:16

## 2023-12-06 RX ADMIN — KETOROLAC TROMETHAMINE 30 MG: 30 INJECTION, SOLUTION INTRAMUSCULAR; INTRAVENOUS at 17:17

## 2023-12-06 RX ADMIN — SUCCINYLCHOLINE CHLORIDE 200 MG: 20 INJECTION, SOLUTION INTRAMUSCULAR; INTRAVENOUS at 10:51

## 2023-12-06 RX ADMIN — FENTANYL CITRATE 50 MCG: 50 INJECTION, SOLUTION INTRAMUSCULAR; INTRAVENOUS at 10:41

## 2023-12-06 RX ADMIN — HYDROMORPHONE HYDROCHLORIDE 0.3 MG: 1 INJECTION, SOLUTION INTRAMUSCULAR; INTRAVENOUS; SUBCUTANEOUS at 11:25

## 2023-12-06 RX ADMIN — DEXAMETHASONE SODIUM PHOSPHATE 4 MG: 4 INJECTION INTRA-ARTICULAR; INTRALESIONAL; INTRAMUSCULAR; INTRAVENOUS; SOFT TISSUE at 10:53

## 2023-12-06 RX ADMIN — OXYCODONE HYDROCHLORIDE 10 MG: 5 TABLET ORAL at 17:16

## 2023-12-06 RX ADMIN — FENTANYL CITRATE 50 MCG: 50 INJECTION, SOLUTION INTRAMUSCULAR; INTRAVENOUS at 10:55

## 2023-12-06 RX ADMIN — DEXMEDETOMIDINE HYDROCHLORIDE 8 MCG: 100 INJECTION, SOLUTION INTRAVENOUS at 11:06

## 2023-12-06 RX ADMIN — OXYCODONE HYDROCHLORIDE 10 MG: 5 TABLET ORAL at 13:01

## 2023-12-06 RX ADMIN — Medication 0.8 MCG/KG/MIN: at 09:59

## 2023-12-06 SDOH — SOCIAL STABILITY: SOCIAL INSECURITY: HAVE YOU HAD THOUGHTS OF HARMING ANYONE ELSE?: NO

## 2023-12-06 SDOH — HEALTH STABILITY: MENTAL HEALTH: NON-SPECIFIC ACTIVE SUICIDAL THOUGHTS (PAST 1 MONTH): NO

## 2023-12-06 SDOH — HEALTH STABILITY: MENTAL HEALTH: HAVE YOU USED ANY PRESCRIPTION DRUGS OTHER THAN PRESCRIBED IN THE PAST 12 MONTHS?: NO

## 2023-12-06 SDOH — SOCIAL STABILITY: SOCIAL INSECURITY: HAS ANYONE EVER THREATENED TO HURT YOUR FAMILY OR YOUR PETS?: NO

## 2023-12-06 SDOH — HEALTH STABILITY: MENTAL HEALTH: HAVE YOU USED ANY SUBSTANCES (CANABIS, COCAINE, HEROIN, HALLUCINOGENS, INHALANTS, ETC.) IN THE PAST 12 MONTHS?: NO

## 2023-12-06 SDOH — SOCIAL STABILITY: SOCIAL INSECURITY: DO YOU FEEL ANYONE HAS EXPLOITED OR TAKEN ADVANTAGE OF YOU FINANCIALLY OR OF YOUR PERSONAL PROPERTY?: NO

## 2023-12-06 SDOH — SOCIAL STABILITY: SOCIAL INSECURITY: PHYSICAL ABUSE: DENIES

## 2023-12-06 SDOH — HEALTH STABILITY: MENTAL HEALTH: WISH TO BE DEAD (PAST 1 MONTH): NO

## 2023-12-06 SDOH — SOCIAL STABILITY: SOCIAL INSECURITY: DOES ANYONE TRY TO KEEP YOU FROM HAVING/CONTACTING OTHER FRIENDS OR DOING THINGS OUTSIDE YOUR HOME?: NO

## 2023-12-06 SDOH — SOCIAL STABILITY: SOCIAL INSECURITY: ABUSE SCREEN: ADULT

## 2023-12-06 SDOH — SOCIAL STABILITY: SOCIAL INSECURITY: ARE YOU OR HAVE YOU BEEN THREATENED OR ABUSED PHYSICALLY, EMOTIONALLY, OR SEXUALLY BY ANYONE?: NO

## 2023-12-06 SDOH — HEALTH STABILITY: MENTAL HEALTH: SUICIDAL BEHAVIOR (LIFETIME): NO

## 2023-12-06 SDOH — SOCIAL STABILITY: SOCIAL INSECURITY: VERBAL ABUSE: DENIES

## 2023-12-06 SDOH — HEALTH STABILITY: MENTAL HEALTH: CURRENT SMOKER: 0

## 2023-12-06 SDOH — ECONOMIC STABILITY: HOUSING INSECURITY: DO YOU FEEL UNSAFE GOING BACK TO THE PLACE WHERE YOU ARE LIVING?: NO

## 2023-12-06 SDOH — HEALTH STABILITY: MENTAL HEALTH: WERE YOU ABLE TO COMPLETE ALL THE BEHAVIORAL HEALTH SCREENINGS?: YES

## 2023-12-06 SDOH — SOCIAL STABILITY: SOCIAL INSECURITY: ARE THERE ANY APPARENT SIGNS OF INJURIES/BEHAVIORS THAT COULD BE RELATED TO ABUSE/NEGLECT?: NO

## 2023-12-06 ASSESSMENT — PATIENT HEALTH QUESTIONNAIRE - PHQ9
1. LITTLE INTEREST OR PLEASURE IN DOING THINGS: NOT AT ALL
2. FEELING DOWN, DEPRESSED OR HOPELESS: NOT AT ALL
SUM OF ALL RESPONSES TO PHQ9 QUESTIONS 1 & 2: 0

## 2023-12-06 ASSESSMENT — PAIN SCALES - GENERAL
PAINLEVEL_OUTOF10: 6
PAINLEVEL_OUTOF10: 9
PAINLEVEL_OUTOF10: 0 - NO PAIN
PAINLEVEL_OUTOF10: 7
PAIN_LEVEL: 0
PAINLEVEL_OUTOF10: 10 - WORST POSSIBLE PAIN
PAINLEVEL_OUTOF10: 9
PAINLEVEL_OUTOF10: 7
PAINLEVEL_OUTOF10: 9

## 2023-12-06 ASSESSMENT — LIFESTYLE VARIABLES
AUDIT-C TOTAL SCORE: 0
HOW OFTEN DO YOU HAVE A DRINK CONTAINING ALCOHOL: NEVER
HOW OFTEN DO YOU HAVE 6 OR MORE DRINKS ON ONE OCCASION: NEVER
SKIP TO QUESTIONS 9-10: 1
HOW MANY STANDARD DRINKS CONTAINING ALCOHOL DO YOU HAVE ON A TYPICAL DAY: PATIENT DOES NOT DRINK
AUDIT-C TOTAL SCORE: 0

## 2023-12-06 ASSESSMENT — PAIN - FUNCTIONAL ASSESSMENT
PAIN_FUNCTIONAL_ASSESSMENT: 0-10

## 2023-12-06 ASSESSMENT — PAIN DESCRIPTION - LOCATION
LOCATION: ABDOMEN

## 2023-12-06 NOTE — L&D DELIVERY NOTE
OB Delivery Note  2023  Raegan Ozuna  37 y.o.   , Low Transverse        Gestational Age: 38w1d  /Para:   Quantitative Blood Loss: 1061cc    Enzo Ozuna [[id number]]      Labor Events    Sac identifier: Sac 1  Rupture date/time: 2023 1035  Rupture type: Artificial  Fluid color: Clear  Fluid odor: None  Labor type:  Without Labor  Labor allowed to proceed with plans for an attempted vaginal birth?: No       Placenta    Placenta delivery date/time: 2023 1038  Placenta removal: Manual removal  Placenta appearance: Intact  Placenta disposition: pathology       Cord    Vessels: 3 vessels  Delayed cord clamping?: Yes  Cord blood disposition: Lab  Gases sent?: No  Stem cell collection (by provider): No       Anesthesia    Method: Spinal, General       Operative Delivery    Forceps attempted?: No  Vacuum extractor attempted?: No       Shoulder Dystocia    Shoulder dystocia present?: No        Delivery    Birth date/time: 2023 10:35:00  Delivery type: , Low Transverse       Resuscitation    Method: Tactile stimulation, Suctioning       Apgars    Living status:   Apgar Component Scores:  1 min.:  5 min.:  10 min.:  15 min.:  20 min.:    Skin color:         Heart rate:         Reflex irritability:         Muscle tone:         Respiratory effort:         Total:                Delivery Providers    Delivering clinician: Aline Nair MD   Provider Role    Uma Frank, BRIDGET Delivery Nurse    Louise Ray, RN Nursery Nurse     Resident    Farzana Estrada, RN Delivery Nurse               OB  Delivery Note    2023  Raegan Ozuna  37 y.o.    Review the Delivery Report for details.     Gestational Age: 38w1d  /Para:   Labor Complications:    Estimated Blood Loss:   Delivery Blood Loss  23 1029 - 23 1120      None            Quantitative Blood Loss:    Delivery Type: , Low Transverse   ROM to Delivery Time: 0h  00m  [place]low Sex: Male   Weight: 3610 g    1 Minute 5 Minute 10 Minute   Apgar Totals:              Enzo Ozuna [[id number]]      Delivery Providers    Delivering clinician: Aline Nair MD   Provider Role    Uma Frank, BRIDGET Delivery Nurse    Louise Ray, RN Nursery Nurse     Resident    Farzana Estrada, RN Delivery Nurse                Details    Pre-Op Diagnosis: 1. Intrauterine pregnancy at 38w1d  2. A2GDM  3. Obesity  4. History CD x 2   Post-Op Diagnosis: 1. Same and viable male infant   Indications:   Two prior CD and A2GDM   Procedure:   , Low Transverse  via   incision   Anesthesia: Spinal;General    Findings: Male infant delivered, weighing 3610 g . Normal uterus, tubes, and ovaries.   Informed Consent:  The risks, benefits, complications, and alternatives were discussed with the patient. The patient understood that the risks of  section include, but are not limited to: injury to nearby structures or organs, infection, blood loss and possible need for transfusion, and potential need for more surgery including hysterectomy. The patient stated understanding and desired to proceed. All questions were answered. The site of surgery was properly noted and marked. The patient was identified as Raegan Ozuna and the procedure verified as a  delivery. A Time Out was held and the above information confirmed.    Procedure Details:  The patient was taken to the operating room where Spinal;General  anesthesia was found to be adequate. Antibiotics were given for infection prophylaxis. She was prepped and draped in the normal sterile fashion in the dorsal supine position with leftward tilt. A Pfannenstiel skin incision was made with scalpel. The incision was carried down to the fascia with bovie cauterization. The fascia was incised and extended laterally with Guillaume scissors. The inferior aspect of the fascia was grasped with Kocher clamps. The underlying rectus and  pyramidalis muscles were dissected off with Guillaume scissors. In a similar fashion, the superior aspect of the fascia was elevated with Kocher clamps, and the rectus muscle was dissected off. The rectus muscles were  bluntly down the midline down to the level of the pubic symphysis. The peritoneum was identified and entered bluntly. Peritoneal incision was extended superiorly and inferiorly with good visualization of the bladder.    The bladder blade was inserted, vesicouterine peritoneum was identified. The lower uterine segment was then incised with a   incision and was extended bluntly. The amniotic sac was ruptured and Clear  color noted. The bladder blade was removed and the infant was delivered atraumatically using the usual maneuvers. The remainder of the infant was delivered, the cord clamped and cut, and the baby was handed off to the awaiting clinicians.     The placenta was removed via manual massage. The uterus was then exteriorized and cleared of all clots and debris. The hysterotomy was repaired with suture of #1 Vicryl in a running locked fashion. Additional figure of 8 sutures were placed for hemostasisThe ovaries and tubes appeared normal bilaterally. The uterus, tubes, and ovaries were then returned to the abdomen and pelvis. The uterine incision was reinspected and found to be hemostatic. The subfascial spaces were inspected and noted to be hemostatic. The fascia was then reapproximated with two running sutures of #1 Prolene tied at the midline. The skin was closed with 4-0 Vicryl sutures.    The patient tolerated the procedure well. Sponge, instrument, and needle counts were correct and the patient was taken to the recovery room in good and stable condition.    MD Aline Crowe MD

## 2023-12-06 NOTE — CARE PLAN
Problem: Vaginal Birth or  Section  Goal: Minimal s/sx of HDP and BP<160/110  Outcome: Progressing  Goal: No s/sx of infection through recovery  Outcome: Progressing  Goal: No s/sx of hemorrhage through recovery  Outcome: Progressing     Problem:  Recovery Care  Goal: Verbalizes understanding of post-op instructions  Outcome: Progressing  Goal: Manages discomfort  Outcome: Progressing  Goal: Dressing intact until removed with any drainage marked  Outcome: Progressing  Goal: Patient vital signs are stable  Outcome: Progressing  Goal: Urine output is 0.5 mL/kg/hr or more  Outcome: Progressing  Goal: Fundus firm at midline  Outcome: Progressing     Problem: Postpartum hemorrhage  Goal: Hemodynamic stability and limit blood loss  Outcome: Progressing     Problem: Pain - Adult  Goal: Verbalizes/displays adequate comfort level or baseline comfort level  Outcome: Progressing     Problem: Safety - Adult  Goal: Free from fall injury  Outcome: Progressing

## 2023-12-06 NOTE — ANESTHESIA PROCEDURE NOTES
Airway  Date/Time: 12/6/2023 10:51 AM  Urgency: elective    Airway not difficult    Staffing  Performed: FLORENCIO   Authorized by: Kennedy Rondon DO    Performed by: MADISON Jiménez-DELONTE  Patient location during procedure: OR    Indications and Patient Condition  Indications for airway management: anesthesia and airway protection  Spontaneous ventilation: present  Sedation level: moderate (conscious sedation)  Preoxygenated: yes  Patient position: sniffing  Mask difficulty assessment: 0 - not attempted    Final Airway Details  Final airway type: endotracheal airway      Successful airway: ETT  Cuffed: yes   Successful intubation technique: video laryngoscopy  Endotracheal tube insertion site: oral  Blade size: #4  ETT size (mm): 6.5  Cormack-Lehane Classification: grade I - full view of glottis  Placement verified by: chest auscultation and capnometry   Measured from: lips  ETT to lips (cm): 21  Number of attempts at approach: 1

## 2023-12-06 NOTE — ANESTHESIA POSTPROCEDURE EVALUATION
Patient: Raegan Ozuna    Procedure Summary       Date: 23 Room / Location: [facility] 04 / Virtual [facility] 2 OB    Anesthesia Start: 932 Anesthesia Stop:     Procedure: Repeat  Section x 3 @ 38.1 wks - LGA and GDM Diagnosis:       Uterine scar from previous  delivery      Insulin controlled gestational diabetes mellitus (GDM) in third trimester      Excessive fetal growth affecting management of pregnancy, antepartum, single or unspecified fetus      (Uterine scar from previous  delivery [N85.A])      (Insulin controlled gestational diabetes mellitus (GDM) in third trimester [O24.414])      (Excessive fetal growth affecting management of pregnancy, antepartum, single or unspecified fetus [O36.60X0])    Surgeons: Aline Nair MD Responsible Provider: Kennedy Rondon DO    Anesthesia Type: CSE ASA Status: 2            Anesthesia Type: CSE    Vitals Value Taken Time   /58 23 1231   Temp 36.3 23 1237   Pulse 77 23 1233   Resp 16 23 1231   SpO2 100 % 23 1233       Anesthesia Post Evaluation    Patient location during evaluation: PACU  Patient participation: complete - patient participated  Level of consciousness: awake  Pain score: 0  Pain management: adequate  Multimodal analgesia pain management approach  Airway patency: patent  Two or more strategies used to mitigate risk of obstructive sleep apnea  Cardiovascular status: acceptable  Respiratory status: face mask  Hydration status: acceptable  Postoperative Nausea and Vomiting: none        Encounter Notable Events   Notable Event Outcome Phase Comment   Insufficient regional analgesia requiring intervention Resolved Out of Room Intraprocedure Unable to thread catheter during CSE. Appropriate level spinal but short lived. Planned induction and intubation after  delivery. Extubated uneventfully.

## 2023-12-06 NOTE — ANESTHESIA PREPROCEDURE EVALUATION
Patient: Raegan Ozuna    Evaluation Method: In-person visit    Procedure Information       Date/Time: 23 0830    Procedure: Repeat  Section x 3 @ 38.1 wks - LGA and GDM    Location: [facility] 04 / Virtual [facility] 2 OB    Surgeons: Aline Nair MD            Relevant Problems   Anesthesia   (+) PONV (postoperative nausea and vomiting)      Cardiovascular   (+) Breast pain      Endocrine   (+) Gestational diabetes mellitus (GDM) in third trimester   (+) Obesity in pregnancy      GI   (+) Gastroesophageal reflux disease without esophagitis      /Renal   (+) Urinary tract infection in mother during third trimester of pregnancy      Pulmonary (within normal limits)      Infectious Disease   (+) Urinary tract infection in mother during third trimester of pregnancy       Clinical information reviewed:   Tobacco  Allergies  Meds  Problems  Med Hx  Surg Hx   Fam Hx  Soc   Hx        NPO Detail:  No data recorded     OB/Gyn Evaluation    Present Pregnancy    Patient is pregnant now.  (+) , previous  section - primary   Obstetric History    (+)  section, gestational diabetes - GDMA1         Complicated UTI at 21wks     Physical Exam    Airway  Mallampati: III  TM distance: >3 FB     Cardiovascular   Rhythm: regular  Rate: normal     Dental        Pulmonary   Breath sounds clear to auscultation     Abdominal            Anesthesia Plan    ASA 2     CSE     The patient is not a current smoker.  Patient did not smoke on day of procedure.    Anesthetic plan and risks discussed with patient, spouse and mother.  Use of blood products discussed with patient, spouse and mother who consented to blood products.    Plan discussed with CRNA and attending.

## 2023-12-06 NOTE — H&P
Interval Obstetrical Admission History and Physical     Patient Description:  Raegan Ozuna is a 37 y.o.  gravid, female. Patient's last menstrual period was 2023. with an Estimated Date of Delivery of 2023, by Last Menstrual Period, is scheduled for repeat  section at 38w1d.     Pregnancy complications  GDM Class II - well controlled on NPH 14iu at bedtime   LGA   H/O CD x 2       The patient's Prenatal Records have been reviewed and are on the chart.     ObHx-, TAB , then miscarriage followed by ectopic pregnancy ) delivered via  x2, this pregnancy complicated by sepsis and complicated UTI  GynHx-regular periods, last PAP , remote h/x abnormal PAP () and had colpo  MedHx-? Autoimmune disorder- has not seen immunologist in a while   SurgHx-tonsilectomy, L elbow, c/s x2 as above   FamHx-denies pertinent h/x  SocHx-denies ETOH, tobacco, or illicit drugs  All-NKDA  Meds-Macrobid daily, PNV     Laboratory: I have reviewed pertinent lab studies.     Physical Exam:  /73   Pulse 85   Temp 36.7 °C (98.1 °F) (Temporal)   Resp 16   Raegan is a well developed, well nourished, gravid, female, in no acute distress. She is alert and cooperative.  Contractions are every irritability and lasting  .  FHR is  , with Accelerations, and a Category I tracing.  The fetus is in a   presentation.   Cervix is not evaluated. Membranes are  .       There is no change in physical condition since the previously submitted Admission History and Physical Examination.     Plans: Options for delivery have been discussed with the patient, and she elects for repeat  section. The risks, benefits, complications, alternatives, expected outcomes, potential problems during recuperation and recovery, and the risks of not performing the procedure were discussed with the patient. The patient stated understanding that the risks include, but are not limited to: death; reaction to  medications; injury to bowel, bladder, ureters, uterus, cervix, vagina, and other pelvic and abdominal structures, infection; blood loss and possible need for transfusion; and potential need for more surgery, including hysterectomy. The risks of injury to the infant were also discussed. The possible need for a  section in the future was explained. All questions were answered. There was concurrence with the planned procedure, and the patient wanted to proceed.     Expectations discussed.     All questions have been answered to the patient's satisfaction.     No guarantee was expressed or implied as to the results of the procedure. Informed consent was given, as well as a request to proceed with repeat  section.

## 2023-12-07 LAB
BLOOD EXPIRATION DATE: NORMAL
DISPENSE STATUS: NORMAL
ERYTHROCYTE [DISTWIDTH] IN BLOOD BY AUTOMATED COUNT: 13.3 % (ref 11.5–14.5)
GLUCOSE BLD MANUAL STRIP-MCNC: 78 MG/DL (ref 74–99)
HCT VFR BLD AUTO: 31.3 % (ref 36–46)
HGB BLD-MCNC: 10.3 G/DL (ref 12–16)
MCH RBC QN AUTO: 29.1 PG (ref 26–34)
MCHC RBC AUTO-ENTMCNC: 32.9 G/DL (ref 32–36)
MCV RBC AUTO: 88 FL (ref 80–100)
NRBC BLD-RTO: 0 /100 WBCS (ref 0–0)
PLATELET # BLD AUTO: 210 X10*3/UL (ref 150–450)
PRODUCT BLOOD TYPE: 6200
PRODUCT CODE: NORMAL
RBC # BLD AUTO: 3.54 X10*6/UL (ref 4–5.2)
UNIT ABO: NORMAL
UNIT NUMBER: NORMAL
UNIT RH: NORMAL
UNIT VOLUME: 350
WBC # BLD AUTO: 12.1 X10*3/UL (ref 4.4–11.3)
XM INTEP: NORMAL

## 2023-12-07 PROCEDURE — 82947 ASSAY GLUCOSE BLOOD QUANT: CPT

## 2023-12-07 PROCEDURE — 96372 THER/PROPH/DIAG INJ SC/IM: CPT | Performed by: OBSTETRICS & GYNECOLOGY

## 2023-12-07 PROCEDURE — 1210000001 HC SEMI-PRIVATE ROOM DAILY

## 2023-12-07 PROCEDURE — 2500000004 HC RX 250 GENERAL PHARMACY W/ HCPCS (ALT 636 FOR OP/ED): Performed by: OBSTETRICS & GYNECOLOGY

## 2023-12-07 PROCEDURE — 2500000001 HC RX 250 WO HCPCS SELF ADMINISTERED DRUGS (ALT 637 FOR MEDICARE OP): Performed by: OBSTETRICS & GYNECOLOGY

## 2023-12-07 RX ADMIN — ACETAMINOPHEN 975 MG: 325 TABLET ORAL at 11:28

## 2023-12-07 RX ADMIN — ACETAMINOPHEN 975 MG: 325 TABLET ORAL at 17:58

## 2023-12-07 RX ADMIN — OXYCODONE HYDROCHLORIDE 5 MG: 5 TABLET ORAL at 18:02

## 2023-12-07 RX ADMIN — ENOXAPARIN SODIUM 40 MG: 100 INJECTION SUBCUTANEOUS at 17:59

## 2023-12-07 RX ADMIN — ACETAMINOPHEN 975 MG: 325 TABLET ORAL at 05:14

## 2023-12-07 RX ADMIN — OXYCODONE HYDROCHLORIDE 10 MG: 5 TABLET ORAL at 01:09

## 2023-12-07 RX ADMIN — ACETAMINOPHEN 975 MG: 325 TABLET ORAL at 23:55

## 2023-12-07 RX ADMIN — IBUPROFEN 600 MG: 600 TABLET, FILM COATED ORAL at 11:29

## 2023-12-07 RX ADMIN — IBUPROFEN 600 MG: 600 TABLET, FILM COATED ORAL at 17:59

## 2023-12-07 RX ADMIN — OXYCODONE HYDROCHLORIDE 10 MG: 5 TABLET ORAL at 13:27

## 2023-12-07 RX ADMIN — KETOROLAC TROMETHAMINE 30 MG: 30 INJECTION, SOLUTION INTRAMUSCULAR; INTRAVENOUS at 05:14

## 2023-12-07 RX ADMIN — OXYCODONE HYDROCHLORIDE 10 MG: 5 TABLET ORAL at 08:12

## 2023-12-07 RX ADMIN — IBUPROFEN 600 MG: 600 TABLET, FILM COATED ORAL at 23:55

## 2023-12-07 ASSESSMENT — PAIN - FUNCTIONAL ASSESSMENT
PAIN_FUNCTIONAL_ASSESSMENT: 0-10

## 2023-12-07 ASSESSMENT — PAIN SCALES - GENERAL
PAINLEVEL_OUTOF10: 6
PAINLEVEL_OUTOF10: 5 - MODERATE PAIN
PAINLEVEL_OUTOF10: 9
PAINLEVEL_OUTOF10: 9
PAINLEVEL_OUTOF10: 4
PAINLEVEL_OUTOF10: 4
PAINLEVEL_OUTOF10: 9
PAIN_LEVEL: 5
PAINLEVEL_OUTOF10: 9
PAINLEVEL_OUTOF10: 5 - MODERATE PAIN
PAINLEVEL_OUTOF10: 5 - MODERATE PAIN
PAINLEVEL_OUTOF10: 4
PAINLEVEL_OUTOF10: 9
PAINLEVEL_OUTOF10: 9
PAINLEVEL_OUTOF10: 3
PAINLEVEL_OUTOF10: 3

## 2023-12-07 ASSESSMENT — PAIN DESCRIPTION - DESCRIPTORS: DESCRIPTORS: TENDER

## 2023-12-07 ASSESSMENT — ACTIVITIES OF DAILY LIVING (ADL): LACK_OF_TRANSPORTATION: NO

## 2023-12-07 NOTE — PROGRESS NOTES
Postpartum Progress Note    Assessment/Plan     Raegan Ozuna is a 37 y.o., , who initially presented for [facility]  She had a , Low Transverse  delivery on 2023  at 38w1d and is now POD1.    #Post-op , Low Transverse   - continue routine postoperative care  - pain well controlled on ERAS protocol; using prn oxy 10  - Hg  13.1  --> EBL 1060 -> 10.3  - DVT Score: 7 SCDs and enoxaparin prophylactic dose daily while inpatient  - The patient's blood type is A POS.  Rhogam is not indicated.    #Maternal Well-Being  - pregnancy n/f: urosepsis on macrobid ppx; msg sent to Noa Encinas NP to determine if she needs to continue (pt breastfeeding, peds does not want macrobid used within first 8 days PP. If continued ppx required, pt will need alternative agent.)  - emotional support provided  - bonding with infant  - GDMA2: was on NPH 14u HS; for 2hr OGTT as outpatient  - [place] feeding: breastfeeding/pumping encouraged; lactation consult prn     #Dispo  - anticipate d/c on POD #3 if meeting all post-op and postpartum milestones  - for f/u 2wks with Primary OB provider     Principal Problem:    Excessive fetal growth affecting management of mother, antepartum  Active Problems:    Gestational diabetes mellitus (GDM) in third trimester    Uterine scar from previous  delivery    PONV (postoperative nausea and vomiting)    Gastroesophageal reflux disease without esophagitis    38 weeks gestation of pregnancy      Subjective   Her pain is well controlled with current medications  She is not passing flatus  She is ambulating independently  She is tolerating a Adult diet Regular  She is voiding spontaneously  She reports no breast or nursing problems  She denies emotional concerns today     Denies fever, chills, N/V, diaphoresis, uncontrolled abdominal pain.   States she has more pain than with her previous CS, is tired, but overall is recovering well.  Endorses numbness in her R hand that has been  present since pregnancy.    Objective   Last Vitals:  Temp Pulse Resp BP MAP Pulse Ox   36.4 °C (97.5 °F) 79 16 112/69   96 %     Vitals Min/Max Last 24 Hours:  Temp  Min: 36.3 °C (97.3 °F)  Max: 36.6 °C (97.9 °F)  Pulse  Min: 76  Max: 88  Resp  Min: 16  Max: 16  BP  Min: 100/62  Max: 113/68    Intake/Output:     Intake/Output Summary (Last 24 hours) at 12/7/2023 1527  Last data filed at 12/6/2023 2220  Gross per 24 hour   Intake 908 ml   Output 1150 ml   Net -242 ml       Physical Exam:  General: well appearing, well nourished, postpartum  Obstetric: fundus firm below umbilicus, lochia light  Skin: Warm, dry; no rashes/lesions/erythema; Pfannenstiel incision: clean, dry, well approximated, open to air, negative discharge/warmth/erythema   Breast: No masses, nipple discharge  Neuro: A/Ox3, no gross motor deficit   GI: no distension, appropriately tender, soft, +BS  Respiratory: Even and unlabored on RA, LSCTA BL  Cardiovascular: 2+ BLE edema; No erythema, warmth  Psych: appropriate mood and affect    Lab Data:  Lab Results   Component Value Date    WBC 12.1 (H) 12/06/2023    HGB 10.3 (L) 12/06/2023    HCT 31.3 (L) 12/06/2023     12/06/2023

## 2023-12-07 NOTE — ANESTHESIA POSTPROCEDURE EVALUATION
Patient: Raegan Ozuna    Procedure Summary       Date: 23 Room / Location: [facility] OB 04 / Virtual [facility] 2 OB    Anesthesia Start: 932 Anesthesia Stop:     Procedure: Repeat  Section x 3 @ 38.1 wks - LGA and GDM (Abdomen) Diagnosis:       Uterine scar from previous  delivery      Insulin controlled gestational diabetes mellitus (GDM) in third trimester      Excessive fetal growth affecting management of pregnancy, antepartum, single or unspecified fetus      (Uterine scar from previous  delivery [N85.A])      (Insulin controlled gestational diabetes mellitus (GDM) in third trimester [O24.414])      (Excessive fetal growth affecting management of pregnancy, antepartum, single or unspecified fetus [O36.60X0])    Surgeons: Aline Nair MD Responsible Provider: Kennedy Rondon DO    Anesthesia Type: CSE ASA Status: 2            Anesthesia Type: CSE    Vitals Value Taken Time   /60 23 1316   Temp 36.5 °C (97.7 °F) 23 1130   Pulse 71 23 1316   Resp 16 23 1316   SpO2 100 % 23 1313       Anesthesia Post Evaluation    Patient location during evaluation: bedside  Patient participation: complete - patient participated  Level of consciousness: awake and alert  Pain score: 5  Pain management: adequate  Airway patency: patent  Cardiovascular status: acceptable  Respiratory status: acceptable  Hydration status: acceptable  Postoperative Nausea and Vomiting: none  Comments: Raegan Ozuna is a 37 y.o., , who had a , Low Transverse delivery on 2023 at 38w1d and is now POD1.    She had General Anesthesia without immediate complications noted.       Patient is currently experiencing ongoing pain which is being addressed.     ---------------------------               23                      0330         ---------------------------   BP:          100/62         Pulse:         83           Resp:          16           Temp:    36.3 °C (97.3 °F)   SpO2:          95%         ---------------------------    Neuraxial site assessed. No visible redness or swelling or drainage. Patient able to ambulate and move all extremities without difficulty. Able to void. No complaints of nausea/vomiting. Tolerating PO intake well. No s/sx of PDPH.     Anesthesia will sign off     Edyta Izaguirre MD          Encounter Notable Events   Notable Event Outcome Phase Comment   Insufficient regional analgesia requiring intervention Resolved Out of Room Intraprocedure Unable to thread catheter during CSE. Appropriate level spinal but short lived. Planned induction and intubation after  delivery. Extubated uneventfully.

## 2023-12-07 NOTE — LACTATION NOTE
Lactation Consultant Note  Lactation Consultation       Maternal Information       Maternal Assessment       Infant Assessment       Feeding Assessment       LATCH TOOL       Breast Pump       Other OB Lactation Tools       Patient Follow-up       Other OB Lactation Documentation       Recommendations/Summary  Called to room to assist with a latch. Mom stated she has been latching the baby to the breast and mostly the latch is comfortable. But, the left nipple at times in painful with the latch. Observed her latch and noted the tip and the base to be slightly reddened noting a possible shallow latch at previous feeds.   Reviewed the importance of a deep latch for her comfort and for proper milk transfer.     Reviewed positioning of baby in football hold and in cross cradle hold on both breasts),  use of pillow support, hand placement on baby and on breast, expression of colostrum to the nipple (mom is expressible), prior to latching, latching technique, and use of breast compression and stimulation to keep the baby feeding at the breast longer.  Deeper latch obtained on the left breast and mom stated the latch was comfortable. Noted swallows with stimulation.     Reviewed feeding cues, the normal feeding patterns in the first 24 hours of life, the role of colostrum, output on different days of life, milk production/ supply in the first few days of life, and the benefits of skin to skin.      24 hour weight is not done at this time d/t bath has not happened as of yet.     Encouraged mom to breastfeed on demand with a goal of 8-12 feeds in a 24 hour period. If baby is not showing feeding cues and it has been 3 hours since the last time the baby was fed or the last time the baby attempted to feed, encouraged her to place baby in skin to skin.      Encouraged her to call out with feeds, if needed.     Mom stated she has a breast pump for at home.     Many questions answered.     Encouraged her to utilize the outpatient  lactation resources, if needed.   Contact information given.   [phone] [facility] or [phone] [place]

## 2023-12-07 NOTE — CARE PLAN
The patient's goals for the shift include      The clinical goals for the shift include pt will latch infant independently    Problem: Vaginal Birth or  Section  Goal: Minimal s/sx of HDP and BP<160/110  2023 105 by Maggi Meng RN  Outcome: Progressing  2023 by Maggi Meng RN  Flowsheets (Taken 2023 105)  Minimal s/sx of HDP and BP <160/110: Monitor QBL and vital signs  Goal: No s/sx of infection through recovery  2023 105 by Maggi Meng RN  Outcome: Progressing  2023 by Maggi Meng RN  Flowsheets (Taken 2023 105)  No s/sx of infection through recovery:   Hygiene practices/nesha-care   Monitor QBL and vital signs  Goal: No s/sx of hemorrhage through recovery  2023 by Maggi Meng RN  Outcome: Progressing  2023 by Maggi Meng RN  Flowsheets (Taken 2023 105)  No s/sx of hemorrhage through recovery: Monitor QBL and vital signs

## 2023-12-08 PROCEDURE — 2500000004 HC RX 250 GENERAL PHARMACY W/ HCPCS (ALT 636 FOR OP/ED): Performed by: OBSTETRICS & GYNECOLOGY

## 2023-12-08 PROCEDURE — 96372 THER/PROPH/DIAG INJ SC/IM: CPT | Performed by: OBSTETRICS & GYNECOLOGY

## 2023-12-08 PROCEDURE — 87086 URINE CULTURE/COLONY COUNT: CPT | Performed by: NURSE PRACTITIONER

## 2023-12-08 PROCEDURE — 2500000001 HC RX 250 WO HCPCS SELF ADMINISTERED DRUGS (ALT 637 FOR MEDICARE OP): Performed by: OBSTETRICS & GYNECOLOGY

## 2023-12-08 PROCEDURE — 2500000005 HC RX 250 GENERAL PHARMACY W/O HCPCS: Performed by: OBSTETRICS & GYNECOLOGY

## 2023-12-08 PROCEDURE — 1210000001 HC SEMI-PRIVATE ROOM DAILY

## 2023-12-08 RX ADMIN — IBUPROFEN 600 MG: 600 TABLET, FILM COATED ORAL at 05:30

## 2023-12-08 RX ADMIN — IBUPROFEN 600 MG: 600 TABLET, FILM COATED ORAL at 18:14

## 2023-12-08 RX ADMIN — OXYCODONE HYDROCHLORIDE 5 MG: 5 TABLET ORAL at 03:40

## 2023-12-08 RX ADMIN — POLYETHYLENE GLYCOL 3350 17 G: 17 POWDER, FOR SOLUTION ORAL at 16:59

## 2023-12-08 RX ADMIN — ACETAMINOPHEN 975 MG: 325 TABLET ORAL at 11:50

## 2023-12-08 RX ADMIN — ACETAMINOPHEN 975 MG: 325 TABLET ORAL at 05:30

## 2023-12-08 RX ADMIN — SIMETHICONE 80 MG: 80 TABLET, CHEWABLE ORAL at 17:00

## 2023-12-08 RX ADMIN — ACETAMINOPHEN 975 MG: 325 TABLET ORAL at 18:13

## 2023-12-08 RX ADMIN — ENOXAPARIN SODIUM 40 MG: 100 INJECTION SUBCUTANEOUS at 19:41

## 2023-12-08 RX ADMIN — IBUPROFEN 600 MG: 600 TABLET, FILM COATED ORAL at 11:50

## 2023-12-08 RX ADMIN — LIDOCAINE 1 PATCH: 4 PATCH TOPICAL at 17:01

## 2023-12-08 RX ADMIN — OXYCODONE HYDROCHLORIDE 5 MG: 5 TABLET ORAL at 17:00

## 2023-12-08 ASSESSMENT — PAIN SCALES - GENERAL
PAINLEVEL_OUTOF10: 4
PAINLEVEL_OUTOF10: 4
PAINLEVEL_OUTOF10: 2
PAINLEVEL_OUTOF10: 6
PAINLEVEL_OUTOF10: 3
PAINLEVEL_OUTOF10: 0 - NO PAIN
PAINLEVEL_OUTOF10: 7

## 2023-12-08 ASSESSMENT — PAIN DESCRIPTION - DESCRIPTORS: DESCRIPTORS: SHARP

## 2023-12-08 ASSESSMENT — PAIN DESCRIPTION - LOCATION: LOCATION: ABDOMEN

## 2023-12-08 ASSESSMENT — PAIN - FUNCTIONAL ASSESSMENT: PAIN_FUNCTIONAL_ASSESSMENT: 0-10

## 2023-12-08 NOTE — LACTATION NOTE
Lactation Consultant Note  Lactation Consultation  Reason for Consult: Follow-up assessment  Consultant Name: TYESHA Jeronimo    Maternal Information  Has mother  before?: Yes  How long did the mother previously breastfeed?: 4-6 months  Previous Maternal Breastfeeding Challenges: Low milk supply, Difficult latch, Lack of support  Infant to breast within first 2 hours of birth?: Yes  Exclusive Pump and Bottle Feed: No    Maternal Assessment  Breast Assessment: Medium, Soft  Nipple Assessment: Intact, Sore  Alterations in Nipple Condition: Stage I - pain or irritation with no skin break down  Areola Assessment: Normal    Infant Assessment  Infant Behavior: Awake, Readiness to feed  Infant Assessment: Receding chin    Feeding Assessment  Nutrition Source: Breastmilk  Feeding Method: Nursing at the breast  Feeding Position: Baby led, Football/seated, Cross - cradle, Cradle  Suck/Feeding: Sustained, Coordinated suck/swallow/breathe, Baby led rhythmically  Latch Assessment: Instructed on deep latch, Deep latch obtained, Sucks with long jaw movement, Sucking and swallowing, Frequent audible swallows, Chin moves in rhythmic motion    LATCH TOOL  Latch: Grasps breast, tongue down, lips flanged, rhythmic sucking  Audible Swallowing: Spontaneous and intermittent (24 hours old)  Type of Nipple: Everted (After stimulation)  Comfort (Breast/Nipple): Filling, red/small blisters/bruises, mild/moderate discomfort  Hold (Positioning): No assist from staff, mother able to position/hold infant  LATCH Score: 9    Breast Pump       Other OB Lactation Tools       Patient Follow-up  Inpatient Lactation Follow-up Needed : Yes    Other OB Lactation Documentation  Maternal Risk Factors: Previous low supply    Recommendations/Summary  Reviewed positioning to elicit a wide open mouth, deep latch, and comfortable hold due to mother's carpal tunnel. Discussed flange sizing and galactagogues. All questions answered at this time.

## 2023-12-08 NOTE — PROGRESS NOTES
Postpartum Progress Note    Assessment/Plan   Raegan Ozuna is a 37 y.o., , who delivered at 38w1d gestation    Now PPD#2 s/p , Low Transverse  on 2023   - Continue routine postpartum care  - Pain well controlled on po medications  - DVT risk score DVT Score: 7 , ppx with Lovenox  - Rh positive, no indication for Rhogam    - Hgb:   Results from last 7 days   Lab Units 23  0915 23  0738 23  1147   HEMOGLOBIN g/dL 10.3* 13.1 12.6     - EBL 1060    GDMA2  - fasting glucose 78 on   - discussed 2 hr GTT 6-12 wks postpartum and need for follow up with PCP yearly    History of complicated UTI   - hospitalized early pregnancy with sepsis  - was on prophylactic Macrobid in pregnancy  - dc postpartum per previous note  - asymptomatic, afebrile  - urine culture today, EDWIN Encinas CNP will follow up outpatient    Maternal Well-Being  - Vitals stable  - All questions and concerns address    Bruno Feeding  - Breastfeeding/pumping encouraged  - Lactation consult prn    Contraception  - - Defers contraception to primary OB/PP visit. We discussed pregnancy spacing of at least one year, abstaining from intercourse for 6wks, and the ability to become pregnant in the absence of regular menses. Pt verbalized understanding.  - Encouraged to continue PNV    Dispo  - Anticipate d/c on PPD #3 if meeting all post op and postpartum milestones  - Follow up in 2 wks for incision check with your OB provider. and Follow up in 4-6 wk for postpartum visit with your OB provider.     MADISON Chowdhury-CNP     Principal Problem:    Excessive fetal growth affecting management of mother, antepartum  Active Problems:    Gestational diabetes mellitus (GDM) in third trimester    Uterine scar from previous  delivery    PONV (postoperative nausea and vomiting)    Gastroesophageal reflux disease without esophagitis    38 weeks gestation of pregnancy    Pregnancy Problems (from 23 to present)        Problem Noted Resolved    38 weeks gestation of pregnancy 2023 by Emma Ott MD No    Priority:  Medium      Urinary tract infection in mother during third trimester of pregnancy 10/16/2023 by CAROL Porras No    Priority:  Medium      Overview Signed 10/16/2023 12:44 PM by CAROL Porras     -Hospitalized in early pregnancy and treated for sepsis with readmission a few days later as symptoms returned, GBS + urine, ? Pyelonephritis  -On ppx macrobid daily now  -Treat for GBS + at time of delivery          Supervision of high risk pregnancy in third trimester 10/16/2023 by CAROL Porras No    Priority:  Medium      Overview Addendum 2023  6:36 PM by CAROL Porras     -Was shared care with Dr. Tilley--> ROXANNA to [facility] at time of MFM consult   -Weekly communication with blood sugar line  -Weekly  testing, increase to twice weekly at 36 wks   -GBS not needed   -s/p Tdap   -s/p flu     -PNV weekly   -Plan for delivery at 38.1 wks - rCD with Dr. Nair and this CNP to assist ,              Excessive fetal growth affecting management of pregnancy in third trimester 10/16/2023 by CAROL Porras No    Priority:  Medium      Overview Addendum 2023  6:27 PM by CAROL Porras     -Previously counseled on risk   -Already planning for rCD - discussed delivery at 38 wks, pt agreeable. Will task RN to schedule. Will plan for 38.1 with Dr. Nair and this CNP to assist- scheduled for             Gestational diabetes mellitus (GDM) in third trimester 10/15/2023 by CAROL Porras No    Priority:  Medium      Overview Addendum 2023  8:54 AM by CAROL Porras     -ROXANNA to MFM (10/30)  -1hr-138 (), 3hr (10/5)- fasting 80, 1hr 192, 2hr 161, 3hr 99  - Attended [facility] [place]  - M Consult completed 10/16  -Weekly communication with blood sugar line  -Serial growth ultrasounds starting at 28 weeks    Last  growth : EFW 96%, AC >99%, BPP 8/8, normal MANUEL    - BPP 8/8, normal MANUEL, MVP 8.7     Fetal surveillance if on medications, LGA, poly, or uncontrolled/level of control unknown:    -Twice weekly now until delivery    Current Regimen: Insulin initiated 10/23 at bedtime for elevated fasting levels ; Current regimen NPH 14iu at bedtime, no changes today with BG log review - more than 80% within goal range     Delivery Plan: 38.1-  with Dr. Nair   Intrapartum:  GDM protocol  Postpartum: No medication    Recommend PP 2hr gtt and q3yr F/U with PCP for A1C and TSH           Obesity in pregnancy 10/15/2023 by CAROL Porras No    Priority:  Medium      Overview Addendum 2023  6:11 PM by CAROL Porras     -Previously counseled  -IOM weight gain guidelines 11-20 lbs   - testing twice weekly now  -See GDM dx              Uterine scar from previous  delivery 10/15/2023 by CAROL Porras No    Priority:  Medium      Overview Addendum 2023  6:12 PM by CAROL Porras     -CD x2  -Plans rCD   -Delivering at [facility] at 38 wks                Hospital course: gestational diabetes,  delivery      Subjective      Raegan Ozuna is PPD#2 s/p  who reports feeling overall well.  No acute events overnight.  Voiding spontaneously, passing flatus.  Pain well controlled on PO meds.  Light lochia. Tolerating diet.  Denies HA, N/V, RUQ pain, vision changes, chest pain, or SOB. and Denies dizziness/lightheadedness/LOC/uncontrolled bleeding.    Objective   Allergies:   Patient has no known allergies.         Last Vitals:  Temp Pulse Resp BP MAP Pulse Ox   36.5 °C (97.7 °F) 76 16 125/75   96 %     Vitals Min/Max Last 24 Hours:  Temp  Min: 36.4 °C (97.5 °F)  Max: 36.6 °C (97.9 °F)  Pulse  Min: 69  Max: 81  Resp  Min: 16  Max: 18  BP  Min: 101/64  Max: 125/75    Intake/Output:   No intake or output data in the 24 hours ending 23 1159    Physical  Exam:  General: examination reveals a well developed, well nourished, female, in no acute distress. She is alert and cooperative.  HEENT: external ears normal. Nose normal, no erythema or discharge.  Neck: supple, no significant adenopathy  Lungs: breathing even and unlabored, lungs clear all fields  Cardiac: warm and well perfused, heart rate regular rate and rhythm  Fundus: firm and below umbilicus, lochia light  Abdominal: soft, appropriately tender, non-distended, bowel sounds active, + flatus  Extremities: no redness or tenderness in the calves or thighs, +2 pitting edema BLE  Neurological: alert, oriented, normal speech, no focal findings or movement disorder noted.  Psychological: awake and alert; oriented to person, place, and time.  Skin: incision clean, dry, intact, well approximated, no redness, drainage, or warmth , steri strips intact, mild ecchymosis    Lab Data:  Lab Results   Component Value Date    WBC 12.1 (H) 12/06/2023    HGB 10.3 (L) 12/06/2023    HCT 31.3 (L) 12/06/2023     12/06/2023

## 2023-12-08 NOTE — CARE PLAN
The patient's goals for the shift include pass flatus    The clinical goals for the shift include pain control, pass flatus      Problem:  Recovery Care  Goal: Verbalizes understanding of post-op instructions  Outcome: Progressing  Goal: Manages discomfort  Outcome: Progressing  Goal: Dressing intact until removed with any drainage marked  Outcome: Progressing  Goal: Patient vital signs are stable  Outcome: Progressing  Goal: Urine output is 0.5 mL/kg/hr or more  Outcome: Progressing  Goal: Fundus firm at midline  Outcome: Progressing     Problem: Pain - Adult  Goal: Verbalizes/displays adequate comfort level or baseline comfort level  Outcome: Progressing     Problem: Safety - Adult  Goal: Free from fall injury  Outcome: Progressing     Problem: Postpartum  Goal: Experiences normal postpartum course  Outcome: Progressing  Goal: Appropriate maternal -  bonding  Outcome: Progressing  Goal: Establish and maintain infant feeding pattern for adequate nutrition  Outcome: Progressing  Goal: Incisions, wounds, or drain sites healing without S/S of infection  Outcome: Progressing  Goal: No s/sx infection  Outcome: Progressing  Goal: No s/sx of hemorrhage  Outcome: Progressing     Problem: Hypertensive Disorder of Pregnancy (HDP)  Goal: Minimal s/sx of HDP and BP<160/110  Outcome: Progressing  Goal: Adequate urine output (0.5 ml/kg/hr)  Outcome: Progressing     Problem: Discharge Planning  Goal: Discharge to home or other facility with appropriate resources  Outcome: Progressing

## 2023-12-09 LAB — BACTERIA UR CULT: NO GROWTH

## 2023-12-09 PROCEDURE — 2500000001 HC RX 250 WO HCPCS SELF ADMINISTERED DRUGS (ALT 637 FOR MEDICARE OP): Performed by: OBSTETRICS & GYNECOLOGY

## 2023-12-09 PROCEDURE — 2500000001 HC RX 250 WO HCPCS SELF ADMINISTERED DRUGS (ALT 637 FOR MEDICARE OP): Performed by: NURSE PRACTITIONER

## 2023-12-09 PROCEDURE — 1210000001 HC SEMI-PRIVATE ROOM DAILY

## 2023-12-09 RX ORDER — ACETAMINOPHEN 500 MG
1000 TABLET ORAL EVERY 6 HOURS PRN
Qty: 120 TABLET | Refills: 0
Start: 2023-12-09

## 2023-12-09 RX ORDER — DOCUSATE SODIUM 100 MG/1
100 CAPSULE, LIQUID FILLED ORAL 2 TIMES DAILY PRN
Qty: 60 CAPSULE | Refills: 0 | Status: CANCELLED | OUTPATIENT
Start: 2023-12-09 | End: 2024-01-08

## 2023-12-09 RX ORDER — BISACODYL 5 MG
5 TABLET, DELAYED RELEASE (ENTERIC COATED) ORAL ONCE
Status: COMPLETED | OUTPATIENT
Start: 2023-12-09 | End: 2023-12-09

## 2023-12-09 RX ORDER — IBUPROFEN 600 MG/1
600 TABLET ORAL EVERY 6 HOURS PRN
Qty: 120 TABLET | Refills: 0 | Status: CANCELLED | OUTPATIENT
Start: 2023-12-09 | End: 2024-01-08

## 2023-12-09 RX ORDER — IBUPROFEN 600 MG/1
600 TABLET ORAL EVERY 6 HOURS PRN
Qty: 120 TABLET | Refills: 0
Start: 2023-12-09 | End: 2024-01-08

## 2023-12-09 RX ORDER — NALOXONE HYDROCHLORIDE 4 MG/.1ML
4 SPRAY NASAL AS NEEDED
Qty: 1 EACH | Refills: 1 | Status: CANCELLED | OUTPATIENT
Start: 2023-12-09

## 2023-12-09 RX ORDER — OXYCODONE HYDROCHLORIDE 5 MG/1
5 TABLET ORAL EVERY 6 HOURS PRN
Qty: 20 TABLET | Refills: 0 | Status: CANCELLED | OUTPATIENT
Start: 2023-12-09 | End: 2023-12-14

## 2023-12-09 RX ADMIN — IBUPROFEN 600 MG: 600 TABLET, FILM COATED ORAL at 06:02

## 2023-12-09 RX ADMIN — ACETAMINOPHEN 975 MG: 325 TABLET ORAL at 18:13

## 2023-12-09 RX ADMIN — ACETAMINOPHEN 975 MG: 325 TABLET ORAL at 00:21

## 2023-12-09 RX ADMIN — IBUPROFEN 600 MG: 600 TABLET, FILM COATED ORAL at 18:13

## 2023-12-09 RX ADMIN — ACETAMINOPHEN 975 MG: 325 TABLET ORAL at 23:50

## 2023-12-09 RX ADMIN — IBUPROFEN 600 MG: 600 TABLET, FILM COATED ORAL at 12:11

## 2023-12-09 RX ADMIN — IBUPROFEN 600 MG: 600 TABLET, FILM COATED ORAL at 23:50

## 2023-12-09 RX ADMIN — BISACODYL 5 MG: 5 TABLET, COATED ORAL at 10:56

## 2023-12-09 RX ADMIN — ACETAMINOPHEN 975 MG: 325 TABLET ORAL at 06:02

## 2023-12-09 RX ADMIN — ACETAMINOPHEN 975 MG: 325 TABLET ORAL at 12:11

## 2023-12-09 RX ADMIN — IBUPROFEN 600 MG: 600 TABLET, FILM COATED ORAL at 00:22

## 2023-12-09 ASSESSMENT — PAIN SCALES - GENERAL
PAINLEVEL_OUTOF10: 3
PAINLEVEL_OUTOF10: 0 - NO PAIN
PAINLEVEL_OUTOF10: 3
PAINLEVEL_OUTOF10: 2
PAINLEVEL_OUTOF10: 4
PAINLEVEL_OUTOF10: 4
PAINLEVEL_OUTOF10: 3
PAINLEVEL_OUTOF10: 3

## 2023-12-09 ASSESSMENT — PAIN - FUNCTIONAL ASSESSMENT: PAIN_FUNCTIONAL_ASSESSMENT: 0-10

## 2023-12-09 NOTE — DISCHARGE SUMMARY
Discharge Summary    Admission Date: 2023  Discharge Date: 23  Discharge Diagnosis: rLTCS    Patient Active Problem List   Diagnosis    Allergic rash present on examination    Back pain affecting pregnancy in third trimester    Bleeding in early pregnancy    Breast pain    Delivered by  delivery following previous  delivery    Dermatitis, contact    Electrolyte abnormality    Elevated ALT measurement    Weight gain    Spontaneous ecchymosis    Post-operative state    Multiple nevi    Mouth sores    Low back pain    Left hip pain    Acetabular dysplasia    Immune deficiency disorder, disease or syndrome (CMS/HCC)    Fever, intermittent    Fatigue    Eyelid abnormality    Persistent cough    Lump in neck    Sprain of wrist    Gestational diabetes mellitus (GDM) in third trimester    Obesity in pregnancy    Uterine scar from previous  delivery    Urinary tract infection in mother during third trimester of pregnancy    Supervision of high risk pregnancy in third trimester    Excessive fetal growth affecting management of pregnancy in third trimester    Excessive fetal growth affecting management of mother, antepartum    PONV (postoperative nausea and vomiting)    Gastroesophageal reflux disease without esophagitis    38 weeks gestation of pregnancy       Hospital Course  Raegan Ozuna is a 37 y.o.,     Initially presented for:  [facility]     Admission Date: 2023    Delivery Date: 2023  10:35 AM     Delivery type: , Low Transverse      GA at delivery: 38w1d    Outcome: Living     Anesthesia during delivery: Spinal;General     Intrapartum complications: None     Feeding method: Breastfeeding Status: Yes    Contraception: Defers contraception to primary OB/PP visit. We discussed pregnancy spacing of at least one year, abstaining from intercourse for 6wks, and the ability to become pregnant in the absence of regular menses. Pt verbalized understanding.     Rhogam: The  "patient's blood type is A POS.  Rhogam is not indicated.     Now postpartum day: 3.    Hospital course n/f:    PP course uneventful.  Meeting all postpartum milestones- ambulating independently, passing flatus, tolerating PO intake, lochia light, voiding spontaneously, and pain well controlled with PO meds.     Dispo  OK for DC today and 2 week incision check and 6wk postpartum follow-up.   Will need 2hr OGTT    Pertinent Physical Exam At Time of Discharge  General: well appearing, well nourished, postpartum  Obstetric: fundus firm below umbilicus, lochia light  Skin: Warm, dry; no rashes/lesions/erythema; Pfannenstiel incision: clean, dry, well approximated, open to air, negative discharge/warmth/erythema   Breast: No masses, nipple discharge  Neuro: A/Ox3, conversational, no gross motor deficit   GI: no distension, appropriately tender, soft, +BS  Respiratory: Even and unlabored on RA, LSCTA BL  Cardiovascular: 2+ BLE edema; No erythema, warmth  Psych: appropriate mood and affect       Your medication list        START taking these medications        Instructions Last Dose Given Next Dose Due   ibuprofen 600 mg tablet      Take 1 tablet (600 mg) by mouth every 6 hours if needed for moderate pain (4 - 6) (pain).              CHANGE how you take these medications        Instructions Last Dose Given Next Dose Due   acetaminophen 500 mg tablet  Commonly known as: Tylenol  What changed:   reasons to take this  Another medication with the same name was removed. Continue taking this medication, and follow the directions you see here.      Take 2 tablets (1,000 mg) by mouth every 6 hours if needed for moderate pain (4 - 6).              CONTINUE taking these medications        Instructions Last Dose Given Next Dose Due   ascorbic acid 500 mg tablet  Commonly known as: Vitamin C           BD Ultra-Fine Shalini Pen Needle 32 gauge x 5/32\" needle  Generic drug: pen needle, diabetic      Use one for each injection     "   blood-glucose meter misc      Test fasting and 1 hour post meals       Easy Touch Alcohol Prep Pads pads, medicated  Generic drug: alcohol swabs      Apply 1 each topically 4 times a day.       isopropyl alcohoL 70 % towelette      Test daily before all meals/snacks and once before bedtime.       lancets misc      Test 4 times a day       PRENATAL 2-IRON-FOLIC ACID-OM3 ORAL                  STOP taking these medications      acetone (urine) test strip        amoxicillin-pot clavulanate 875-125 mg tablet  Commonly known as: Augmentin        blood sugar diagnostic strip        cephalexin 500 mg capsule  Commonly known as: Keflex        nitrofurantoin (macrocrystal-monohydrate) 100 mg capsule  Commonly known as: Macrobid        NovoLIN N FlexPen 100 unit/mL (3 mL) injection  Generic drug: insulin NPH (Isophane)        phenazopyridine 200 mg tablet  Commonly known as: Pyridium                  Where to Get Your Medications        Information about where to get these medications is not yet available    Ask your nurse or doctor about these medications  acetaminophen 500 mg tablet  ibuprofen 600 mg tablet           Outpatient Follow-Up  Future Appointments   Date Time Provider Department Center   12/13/2023  9:30 AM [facility] O[facility]YN NURSE RESOURCE [id number]   1/4/2024  9:30 AM Noa Encinas APRN-CNP [id number]       MADISON Santos-CNP

## 2023-12-09 NOTE — LACTATION NOTE
Lactation Consultant Note  Lactation Consultation  Reason for Consult: Follow-up assessment  Consultant Name: TYESHA Jeronimo    Maternal Information  Has mother  before?: Yes    Maternal Assessment  Breast Assessment: Medium  Nipple Assessment: Intact, Rounded after feeding  Areola Assessment: Normal    Infant Assessment  Infant Behavior: Awake  Infant Assessment: Sublingual frenulum (comment), Good cupping of tongue, Other (Comment), Jaundice, Receding chin (unable to sustain suction to finger, clicking at breast with flow, over using jaw/lips)    Feeding Assessment  Nutrition Source: Breastmilk, Donor human milk  Feeding Method: Nursing at the breast, Supplemental nursing system, Feeding expressed breastmilk  Feeding Position: Baby led, Breast sandwich, Cross - cradle, Nipple to nose, Mother demonstrates good positioning  Suck/Feeding: Sustained, Coordinated suck/swallow/breathe, Baby led rhythmically, Choking/gulping, Supplemented breast  Latch Assessment: Eagerly grasped on to latch, Latch achieved, Deep latch obtained, Wide open mouth < 160, Sucking and swallowing, Correct tongue position, Other (Comment) (fatigues at breast)    LATCH TOOL  Latch: Grasps breast, tongue down, lips flanged, rhythmic sucking  Audible Swallowing: Spontaneous and intermittent (24 hours old)  Type of Nipple: Everted (After stimulation)  Comfort (Breast/Nipple): Soft/non-tender  Hold (Positioning): No assist from staff, mother able to position/hold infant  LATCH Score: 10    Breast Pump  Pump: Hospital grade electric pump  Frequency: 5-7 times per day  Duration: 15-20 minutes per session  Breast Shield Size and Type: 24 mm (needs 19 mm)  Volume of Milk Production: 2  Units of Volume: mL per session    Other OB Lactation Tools       Patient Follow-up  Outpatient Lactation Follow-up: Recommended  Lactation Professional - OK to Discharge: Yes    Other OB Lactation Documentation       Recommendations/Summary  Provided resources for  outpatient follow up related to concern for tight lingual frenulum. Reviewed plan of SNS/bottle/pumping and latching. Reviewed paced bottle/sidelying feeding. All questions answered at this time.

## 2023-12-09 NOTE — CARE PLAN
Patient with stable vital signs and assessment.   Problem:  Recovery Care  Goal: Verbalizes understanding of post-op instructions  Outcome: Progressing  Goal: Manages discomfort  Outcome: Progressing  Goal: Patient vital signs are stable  Outcome: Progressing     Problem: Pain - Adult  Goal: Verbalizes/displays adequate comfort level or baseline comfort level  Outcome: Progressing     Problem: Safety - Adult  Goal: Free from fall injury  Outcome: Progressing

## 2023-12-10 VITALS
HEART RATE: 77 BPM | DIASTOLIC BLOOD PRESSURE: 77 MMHG | SYSTOLIC BLOOD PRESSURE: 119 MMHG | RESPIRATION RATE: 16 BRPM | TEMPERATURE: 98.2 F | OXYGEN SATURATION: 97 %

## 2023-12-10 PROCEDURE — 90707 MMR VACCINE SC: CPT | Performed by: OBSTETRICS & GYNECOLOGY

## 2023-12-10 PROCEDURE — 2500000001 HC RX 250 WO HCPCS SELF ADMINISTERED DRUGS (ALT 637 FOR MEDICARE OP): Performed by: OBSTETRICS & GYNECOLOGY

## 2023-12-10 PROCEDURE — 2500000004 HC RX 250 GENERAL PHARMACY W/ HCPCS (ALT 636 FOR OP/ED): Performed by: OBSTETRICS & GYNECOLOGY

## 2023-12-10 PROCEDURE — 90471 IMMUNIZATION ADMIN: CPT | Performed by: OBSTETRICS & GYNECOLOGY

## 2023-12-10 RX ADMIN — ACETAMINOPHEN 975 MG: 325 TABLET ORAL at 09:57

## 2023-12-10 RX ADMIN — IBUPROFEN 600 MG: 600 TABLET, FILM COATED ORAL at 09:57

## 2023-12-10 RX ADMIN — MEASLES, MUMPS, AND RUBELLA VIRUS VACCINE LIVE 0.5 ML: 1000; 12500; 1000 INJECTION, POWDER, LYOPHILIZED, FOR SUSPENSION SUBCUTANEOUS at 11:12

## 2023-12-10 ASSESSMENT — PAIN - FUNCTIONAL ASSESSMENT: PAIN_FUNCTIONAL_ASSESSMENT: 0-10

## 2023-12-10 ASSESSMENT — PAIN SCALES - GENERAL
PAINLEVEL_OUTOF10: 2
PAINLEVEL_OUTOF10: 0 - NO PAIN

## 2023-12-10 NOTE — LACTATION NOTE
Lactation Consultant Note  Lactation Consultation  Reason for Consult: Follow-up assessment  Consultant Name: TYESHA Jeronimo    Maternal Information  Previous Maternal Breastfeeding Challenges: Low milk supply, Difficult latch  Infant to breast within first 2 hours of birth?: Yes  Exclusive Pump and Bottle Feed: No    Maternal Assessment  Breast Assessment: Medium, Filling, Compressible  Nipple Assessment: Compression stripe, Sore  Alterations in Nipple Condition: Stage II - abrasions, shallow crack/fissure, stripe  Areola Assessment: Normal    Infant Assessment  Infant Behavior: Awake  Infant Assessment: Receding chin, Sublingual frenulum (comment)    Feeding Assessment  Nutrition Source: Breastmilk, Donor human milk  Feeding Method: Nursing at the breast, Paced bottle, Feeding expressed breastmilk  Feeding Position: Baby led, Football/seated, Mother demonstrates good positioning  Suck/Feeding: Nipple shield used  Latch Assessment: Deep latch obtained, Sucking and swallowing    LATCH TOOL  Latch: Repeated attempts, hold nipple in mouth, stimulate to suck  Audible Swallowing: Spontaneous and intermittent (24 hours old)  Type of Nipple: Everted (After stimulation)  Comfort (Breast/Nipple): Filling, red/small blisters/bruises, mild/moderate discomfort  Hold (Positioning): No assist from staff, mother able to position/hold infant  LATCH Score: 8    Breast Pump  Pump: Individual user electric pump  Frequency: 8-10 times per day  Duration: 15-20 minutes per session  Breast Shield Size and Type: 21 mm, Other (comment) (19)  Volume of Milk Production: 15 (after nursing)  Units of Volume: mL per session    Other OB Lactation Tools  Lactation Tools: Nipple shields, Flanges, Lanolin    Patient Follow-up  Inpatient Lactation Follow-up Needed : No  Outpatient Lactation Follow-up: Recommended  Lactation Professional - OK to Discharge: Yes    Other OB Lactation Documentation       Recommendations/Summary  Recommended sizing up with  the flange due to nipple damage. Flow improved. Infant latched once flow started to the left side but did not sustain the latch. Offered a 20 mm shield, infant latched and got into a decent suck pattern. Parents plan to follow up for further outpatient support. All questions answered at this time.

## 2023-12-12 LAB
LABORATORY COMMENT REPORT: NORMAL
PATH REPORT.FINAL DX SPEC: NORMAL
PATH REPORT.GROSS SPEC: NORMAL
PATH REPORT.RELEVANT HX SPEC: NORMAL
PATH REPORT.TOTAL CANCER: NORMAL

## 2023-12-12 NOTE — SIGNIFICANT EVENT
Follow-up Phone Call Note:   Interview:  Care Type: Women's Health   Phone Number Called: [phone]   Call Outcome: left a message   Date/Time Of Call: 12/12/23 @ 1722   Call Back Done By: care coordinator   Callback Complete:  Yes

## 2023-12-13 ENCOUNTER — CLINICAL SUPPORT (OUTPATIENT)
Dept: OBSTETRICS AND GYNECOLOGY | Facility: HOSPITAL | Age: 38
End: 2023-12-13
Payer: COMMERCIAL

## 2023-12-13 VITALS — BODY MASS INDEX: 36.67 KG/M2 | DIASTOLIC BLOOD PRESSURE: 85 MMHG | WEIGHT: 207 LBS | SYSTOLIC BLOOD PRESSURE: 135 MMHG

## 2023-12-13 DIAGNOSIS — Z51.89 ENCOUNTER FOR WOUND CARE: Primary | ICD-10-CM

## 2023-12-13 PROCEDURE — 99211 OFF/OP EST MAY X REQ PHY/QHP: CPT

## 2023-12-13 ASSESSMENT — PAIN SCALES - GENERAL: PAINLEVEL: 0-NO PAIN

## 2024-01-04 ENCOUNTER — OFFICE VISIT (OUTPATIENT)
Dept: MATERNAL FETAL MEDICINE | Facility: HOSPITAL | Age: 39
End: 2024-01-04
Payer: COMMERCIAL

## 2024-01-04 VITALS
DIASTOLIC BLOOD PRESSURE: 68 MMHG | HEIGHT: 63 IN | BODY MASS INDEX: 35.44 KG/M2 | WEIGHT: 200 LBS | SYSTOLIC BLOOD PRESSURE: 104 MMHG

## 2024-01-04 DIAGNOSIS — O26.899 CARPAL TUNNEL SYNDROME DURING PREGNANCY (HHS-HCC): Primary | ICD-10-CM

## 2024-01-04 DIAGNOSIS — Z09 POSTOP CHECK: ICD-10-CM

## 2024-01-04 DIAGNOSIS — G56.00 CARPAL TUNNEL SYNDROME DURING PREGNANCY (HHS-HCC): Primary | ICD-10-CM

## 2024-01-04 PROBLEM — Z98.890 POST-OPERATIVE STATE: Status: RESOLVED | Noted: 2023-03-11 | Resolved: 2024-01-04

## 2024-01-04 PROBLEM — R05.3 PERSISTENT COUGH: Status: RESOLVED | Noted: 2023-04-02 | Resolved: 2024-01-04

## 2024-01-04 PROCEDURE — 3074F SYST BP LT 130 MM HG: CPT | Performed by: NURSE PRACTITIONER

## 2024-01-04 PROCEDURE — 3078F DIAST BP <80 MM HG: CPT | Performed by: NURSE PRACTITIONER

## 2024-01-04 PROCEDURE — 1036F TOBACCO NON-USER: CPT | Performed by: NURSE PRACTITIONER

## 2024-01-04 PROCEDURE — 99214 OFFICE O/P EST MOD 30 MIN: CPT | Performed by: NURSE PRACTITIONER

## 2024-01-04 PROCEDURE — 0503F POSTPARTUM CARE VISIT: CPT | Performed by: NURSE PRACTITIONER

## 2024-01-04 ASSESSMENT — EDINBURGH POSTNATAL DEPRESSION SCALE (EPDS)
I HAVE BEEN ANXIOUS OR WORRIED FOR NO GOOD REASON: NO, NOT AT ALL
I HAVE BLAMED MYSELF UNNECESSARILY WHEN THINGS WENT WRONG: NOT VERY OFTEN
I HAVE LOOKED FORWARD WITH ENJOYMENT TO THINGS: AS MUCH AS I EVER DID
I HAVE FELT SAD OR MISERABLE: NO, NOT AT ALL
THE THOUGHT OF HARMING MYSELF HAS OCCURRED TO ME: NEVER
I HAVE BEEN SO UNHAPPY THAT I HAVE HAD DIFFICULTY SLEEPING: NOT AT ALL
I HAVE BEEN ABLE TO LAUGH AND SEE THE FUNNY SIDE OF THINGS: AS MUCH AS I ALWAYS COULD
THINGS HAVE BEEN GETTING ON TOP OF ME: NO, I HAVE BEEN COPING AS WELL AS EVER
I HAVE FELT SCARED OR PANICKY FOR NO GOOD REASON: NO, NOT AT ALL
TOTAL SCORE: 1
I HAVE BEEN SO UNHAPPY THAT I HAVE BEEN CRYING: NO, NEVER

## 2024-01-04 ASSESSMENT — PAIN SCALES - GENERAL: PAINLEVEL: 0-NO PAIN

## 2024-01-04 NOTE — PROGRESS NOTES
Raegan Ozuna is a 38 y.o.  presenting for Postpartum F/U.   Pt delivered  via  on 2023 @ 38w1d   Pregnancy complicated by: GDM/LGA     She is without complaints today.     Breast and bottle feeding   Mood stable   Normal voiding and bowel movements  Bleeding light lochia - reports sometimes spotting  Delivery complications - none  Contraception plans - reviewed options and pt is considering her choices- leaning toward IUD  Last PAP 2022- neg cytology, neg HPV- due 2025         ROS is negative.     Gen-NAD  Cardiac- good peripheral perfusion  Respiratory- non-labored breathing  Abdomen- soft, non-tender, incision C/D/I, well-healed   Extremities- symmetrical           Problem List Items Addressed This Visit          Pregnancy    Carpal tunnel syndrome during pregnancy - Primary    Overview     - 24: Now postpartum with continued concerns for numbness and tingling in her fingers  -Has not improved since delivery on 2023  -Discussed referral to Orthopedics and Occupational Therapy - these were placed          Relevant Orders    Referral to Occupational Therapy    Referral to Orthopaedic Surgery       Other    Postop check    Overview     CD x3 on 23 for LGA and GDM     -Incision C/D/I. Healed well  -Reviewed scar massage   -Reviewed recommendation for future deliveries to be  deliveries if she plans on more children.   -Can see MFM in a next pregnancy              RTC PRN     MADISON Porras-CNP

## 2024-01-16 ENCOUNTER — OFFICE VISIT (OUTPATIENT)
Dept: ORTHOPEDIC SURGERY | Facility: CLINIC | Age: 39
End: 2024-01-16
Payer: COMMERCIAL

## 2024-01-16 VITALS — HEIGHT: 63 IN | BODY MASS INDEX: 35.44 KG/M2 | WEIGHT: 200 LBS

## 2024-01-16 DIAGNOSIS — O26.899 CARPAL TUNNEL SYNDROME DURING PREGNANCY (HHS-HCC): ICD-10-CM

## 2024-01-16 DIAGNOSIS — G56.00 CARPAL TUNNEL SYNDROME DURING PREGNANCY (HHS-HCC): ICD-10-CM

## 2024-01-16 PROCEDURE — 99213 OFFICE O/P EST LOW 20 MIN: CPT | Performed by: ORTHOPAEDIC SURGERY

## 2024-01-16 PROCEDURE — 1036F TOBACCO NON-USER: CPT | Performed by: ORTHOPAEDIC SURGERY

## 2024-01-16 PROCEDURE — 99203 OFFICE O/P NEW LOW 30 MIN: CPT | Performed by: ORTHOPAEDIC SURGERY

## 2024-01-16 PROCEDURE — 20526 THER INJECTION CARP TUNNEL: CPT | Performed by: ORTHOPAEDIC SURGERY

## 2024-01-16 PROCEDURE — 2500000004 HC RX 250 GENERAL PHARMACY W/ HCPCS (ALT 636 FOR OP/ED): Performed by: ORTHOPAEDIC SURGERY

## 2024-01-16 RX ORDER — TRIAMCINOLONE ACETONIDE 40 MG/ML
20 INJECTION, SUSPENSION INTRA-ARTICULAR; INTRAMUSCULAR
Status: COMPLETED | OUTPATIENT
Start: 2024-01-16 | End: 2024-01-16

## 2024-01-16 RX ADMIN — TRIAMCINOLONE ACETONIDE 20 MG: 40 INJECTION, SUSPENSION INTRA-ARTICULAR; INTRAMUSCULAR at 08:51

## 2024-01-16 ASSESSMENT — PAIN DESCRIPTION - DESCRIPTORS: DESCRIPTORS: NUMBNESS

## 2024-01-16 ASSESSMENT — PAIN - FUNCTIONAL ASSESSMENT: PAIN_FUNCTIONAL_ASSESSMENT: 0-10

## 2024-01-16 NOTE — PROGRESS NOTES
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  She delivered her third child 6 weeks ago.  During all 3 of her pregnancies she had nighttime numbness of her right hand but with her prior 2 pregnancies the symptoms went away following delivery.  With this most recent delivery she has had worsening of the numbness of her fingers particularly the right middle finger throughout the day and night.  No issues with her left hand.  She has not had any formal workup or treatment for her symptoms.  She did have gestational diabetes during pregnancy.    Patient's self reported past medical history, medications, allergies, surgical history, family and social history as well as a 10 point review of systems has been documented in the new patient intake form and scanned into the patient's electronic medical record. Pertinent findings are documented in the HPI.    Physical Examination Findings:  Constitutional: Appears well-developed and well-nourished.  Head: Normocephalic and atraumatic.  Eyes: Pupils are equal and round.  Cardiovascular: Intact distal pulses.   Respiratory: Effort normal. No respiratory distress.  Neurologic: Alert and oriented to person, place, and time.  Skin: Skin is warm and dry.  Hematologic / Lymphatic: No lymphedema, lymphangitis.  Psychiatric: normal mood and affect. Behavior is normal.   Musculoskeletal: Right hand examination reveals normal appearance.  She has no triggering.  She has no thenar atrophy.  She has normal digital perfusion.  She has subjectively diminished sensation in all fingers but most significantly in the right middle finger.  She has a positive Tinel sign.  Equivocal Sarah median nerve compression test.    Impression: Carpal tunnel syndrome associate with pregnancy.    Plan: We have discussed this phenomenon.  As she experienced previously oftentimes this resolves after delivery but may continue during  breast-feeding which may be the current situation for her.  I have offered to give her a corticosteroid injection with anticipation that this will help with her symptoms and that when she is done breast-feeding the symptoms may resolve completely.  Repeat injections can be considered.  If she continues to have persistence of symptoms then we could also consider carpal tunnel decompression surgery.    Hand / UE Inj/Asp: R carpal tunnel for carpal tunnel syndrome on 1/16/2024 8:51 AM  Indications: pain and therapeutic  Details: 25 G needle, volar approach  Medications: 20 mg triamcinolone acetonide 40 mg/mL  Outcome: tolerated well, no immediate complications  Procedure, treatment alternatives, risks and benefits explained, specific risks discussed. Consent was given by the patient. Immediately prior to procedure a time out was called to verify the correct patient, procedure, equipment, support staff and site/side marked as required. Patient was prepped and draped in the usual sterile fashion.         Return as needed for recurrence or progression of problems .    Nate Kerns MD    [facility] [employer]  Department of Orthopaedic Surgery  Chief of Hand and Upper Extremity Surgery  [facility]    Dictation performed with the use of voice recognition software. Syntax and grammatical errors may exist.

## 2024-01-16 NOTE — LETTER
January 16, 2024     Cat Reddy DO  [address], [address]  [place] [address]    Patient: Raegan Ozuna   YOB: 1985   Date of Visit: 1/16/2024       Dear Dr. Cat Reddy DO:    Thank you for referring Raegan Ozuna to me for evaluation. Below are my notes for this consultation.  If you have questions, please do not hesitate to call me. I look forward to following your patient along with you.       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  She delivered her third child 6 weeks ago.  During all 3 of her pregnancies she had nighttime numbness of her right hand but with her prior 2 pregnancies the symptoms went away following delivery.  With this most recent delivery she has had worsening of the numbness of her fingers particularly the right middle finger throughout the day and night.  No issues with her left hand.  She has not had any formal workup or treatment for her symptoms.  She did have gestational diabetes during pregnancy.    Patient's self reported past medical history, medications, allergies, surgical history, family and social history as well as a 10 point review of systems has been documented in the new patient intake form and scanned into the patient's electronic medical record. Pertinent findings are documented in the HPI.    Physical Examination Findings:  Constitutional: Appears well-developed and well-nourished.  Head: Normocephalic and atraumatic.  Eyes: Pupils are equal and round.  Cardiovascular: Intact distal pulses.   Respiratory: Effort normal. No respiratory distress.  Neurologic: Alert and oriented to person, place, and time.  Skin: Skin is warm and dry.  Hematologic / Lymphatic: No  lymphedema, lymphangitis.  Psychiatric: normal mood and affect. Behavior is normal.   Musculoskeletal: Right hand examination reveals normal appearance.  She has no triggering.  She has no thenar atrophy.  She has normal digital perfusion.  She has subjectively diminished sensation in all fingers but most significantly in the right middle finger.  She has a positive Tinel sign.  Equivocal Sarah median nerve compression test.    Impression: Carpal tunnel syndrome associate with pregnancy.    Plan: We have discussed this phenomenon.  As she experienced previously oftentimes this resolves after delivery but may continue during breast-feeding which may be the current situation for her.  I have offered to give her a corticosteroid injection with anticipation that this will help with her symptoms and that when she is done breast-feeding the symptoms may resolve completely.  Repeat injections can be considered.  If she continues to have persistence of symptoms then we could also consider carpal tunnel decompression surgery.    Hand / UE Inj/Asp: R carpal tunnel for carpal tunnel syndrome on 1/16/2024 8:51 AM  Indications: pain and therapeutic  Details: 25 G needle, volar approach  Medications: 20 mg triamcinolone acetonide 40 mg/mL  Outcome: tolerated well, no immediate complications  Procedure, treatment alternatives, risks and benefits explained, specific risks discussed. Consent was given by the patient. Immediately prior to procedure a time out was called to verify the correct patient, procedure, equipment, support staff and site/side marked as required. Patient was prepped and draped in the usual sterile fashion.         Return as needed for recurrence or progression of problems .    Nate Kerns MD    [facility] [employer]  Department of Orthopaedic Surgery  Chief of Hand and Upper Extremity Surgery  [facility]    Dictation  performed with the use of voice recognition software. Syntax and grammatical errors may exist.

## 2024-02-05 ENCOUNTER — OFFICE VISIT (OUTPATIENT)
Dept: ORTHOPEDIC SURGERY | Facility: CLINIC | Age: 39
End: 2024-02-05
Payer: COMMERCIAL

## 2024-02-05 VITALS — BODY MASS INDEX: 35.44 KG/M2 | HEIGHT: 63 IN | WEIGHT: 200 LBS

## 2024-02-05 DIAGNOSIS — O26.899 CARPAL TUNNEL SYNDROME DURING PREGNANCY (HHS-HCC): Primary | ICD-10-CM

## 2024-02-05 DIAGNOSIS — G56.00 CARPAL TUNNEL SYNDROME DURING PREGNANCY (HHS-HCC): Primary | ICD-10-CM

## 2024-02-05 PROCEDURE — 20526 THER INJECTION CARP TUNNEL: CPT | Performed by: ORTHOPAEDIC SURGERY

## 2024-02-05 PROCEDURE — 1036F TOBACCO NON-USER: CPT | Performed by: ORTHOPAEDIC SURGERY

## 2024-02-05 PROCEDURE — 99213 OFFICE O/P EST LOW 20 MIN: CPT | Performed by: ORTHOPAEDIC SURGERY

## 2024-02-05 RX ORDER — TRIAMCINOLONE ACETONIDE 40 MG/ML
20 INJECTION, SUSPENSION INTRA-ARTICULAR; INTRAMUSCULAR
Status: COMPLETED | OUTPATIENT
Start: 2024-02-05 | End: 2024-02-05

## 2024-02-05 RX ADMIN — TRIAMCINOLONE ACETONIDE 20 MG: 40 INJECTION, SUSPENSION INTRA-ARTICULAR; INTRAMUSCULAR at 19:14

## 2024-02-05 ASSESSMENT — PAIN SCALES - GENERAL: PAINLEVEL_OUTOF10: 5 - MODERATE PAIN

## 2024-02-05 ASSESSMENT — PAIN DESCRIPTION - DESCRIPTORS: DESCRIPTORS: ACHING

## 2024-02-05 ASSESSMENT — PAIN - FUNCTIONAL ASSESSMENT: PAIN_FUNCTIONAL_ASSESSMENT: 0-10

## 2024-02-05 NOTE — PROGRESS NOTES
Raegan returns to clinic today for her right hand numbness and tingling.     She explains that she has continued right middle finger numbness. This has been present ever since she woke up from anesthesia after a c section delivery of her baby about 9 weeks ago. Initially I saw her in mid January and  thought she pregnancy associate carpal tunnel syndrome. I did an injection at that time. She reports the injection did not change anything and she still has persistent numbness of the middle finger only. There is no pain but the numbness makes it awkward to do many of her normal daily tasks especially with .       Past medical history, medications, allergies, surgical history and review of systems are reviewed and otherwise unchanged when compared to last visit on 01/16/24.         Examination:     Constitutional: Oriented to person, place, and time.     Appears well-developed and well-nourished.     Head: Normocephalic and atraumatic.     Eyes: Pupils are equal, round, and reactive to light.     Cardiovascular: Intact distal pulses.     Pulmonary/Chest/Breast: Effort normal. No respiratory distress.     Neurological: Alert and oriented to person, place, and time.     Skin: Skin is warm and dry.     Psychiatric: normal mood and affect. Behavior is normal.     Musculoskeletal: Examination of the right hand reveals normal appearance no swelling, skin changes or deformities. Negative failing test and negative Durkan median nerve compression test. Positive Tenel's over the transverse carpal ligament.         No new imaging obtained today.          Impression: Presumed carpal tunnel syndrome of the right hand         Plan: We discussed treatment options again today. I recommended trying one additional injection. If this does not change her sensory disturbances in her middle finger, then we need to consider other testing to evaluate for other causes. She will contact me in a month if she is not noticing any  improvement. If that is the case we will consider EMG testing to rule out a bracial plexus issue or cervical radiculuar type issues.    Risks and benefits of steroid injection discussed with patient. Risks include but are not limited to: pain, infection, allergic reaction to injected medications, changes in the color or appearance of the skin near the location site and along lymphatic drainage pathway, lack of response, cartilage damage, ligament / tendon rupture, and glycemic control issues in diabetic patients. Patient expresses understanding of risks and elects to proceed. Verbal consent was provided and a time out procedure was performed to confirm the appropriate injection location. Using aseptic technique 20 mg triamcinolone and 0.5 cc 1% lidocaine were injected into the right carpal tunne. The patient tolerated the injection well. Post injection instructions were provided.      Hand / UE Inj/Asp: R carpal tunnel for carpal tunnel syndrome on 2/5/2024 7:14 PM  Indications: pain and therapeutic  Details: 25 G needle, volar approach  Medications: 20 mg triamcinolone acetonide 40 mg/mL  Outcome: tolerated well, no immediate complications  Procedure, treatment alternatives, risks and benefits explained, specific risks discussed. Consent was given by the patient. Immediately prior to procedure a time out was called to verify the correct patient, procedure, equipment, support staff and site/side marked as required. Patient was prepped and draped in the usual sterile fashion.             Nate Kerns MD          [facility] [employer]     Department of Orthopaedic Surgery     Chief of Hand and Upper Extremity Surgery     [facility]     Scribe Attestation  By signing my name below, Claudia WOODS Scribe   attest that this documentation has been prepared under the direction and in the presence of Nate Kerns MD.

## 2024-02-20 DIAGNOSIS — G56.00 CARPAL TUNNEL SYNDROME DURING PREGNANCY (HHS-HCC): Primary | ICD-10-CM

## 2024-02-20 DIAGNOSIS — O26.899 CARPAL TUNNEL SYNDROME DURING PREGNANCY (HHS-HCC): Primary | ICD-10-CM

## 2024-03-05 ENCOUNTER — HOSPITAL ENCOUNTER (OUTPATIENT)
Dept: NEUROLOGY | Facility: HOSPITAL | Age: 39
Discharge: HOME | End: 2024-03-05
Payer: COMMERCIAL

## 2024-03-05 DIAGNOSIS — O26.899 CARPAL TUNNEL SYNDROME DURING PREGNANCY (HHS-HCC): ICD-10-CM

## 2024-03-05 DIAGNOSIS — G56.00 CARPAL TUNNEL SYNDROME DURING PREGNANCY (HHS-HCC): ICD-10-CM

## 2024-03-05 PROCEDURE — 95911 NRV CNDJ TEST 9-10 STUDIES: CPT | Performed by: PSYCHIATRY & NEUROLOGY

## 2024-03-05 PROCEDURE — 95886 MUSC TEST DONE W/N TEST COMP: CPT | Performed by: PSYCHIATRY & NEUROLOGY

## 2024-04-17 DIAGNOSIS — G56.01 CARPAL TUNNEL SYNDROME ON RIGHT: ICD-10-CM

## 2024-05-02 ENCOUNTER — HOSPITAL ENCOUNTER (OUTPATIENT)
Facility: HOSPITAL | Age: 39
Setting detail: OUTPATIENT SURGERY
Discharge: HOME | End: 2024-05-02
Attending: ORTHOPAEDIC SURGERY | Admitting: ORTHOPAEDIC SURGERY
Payer: COMMERCIAL

## 2024-05-02 ENCOUNTER — ANESTHESIA (OUTPATIENT)
Dept: OPERATING ROOM | Facility: HOSPITAL | Age: 39
End: 2024-05-02
Payer: COMMERCIAL

## 2024-05-02 ENCOUNTER — ANESTHESIA EVENT (OUTPATIENT)
Dept: OPERATING ROOM | Facility: HOSPITAL | Age: 39
End: 2024-05-02
Payer: COMMERCIAL

## 2024-05-02 VITALS
DIASTOLIC BLOOD PRESSURE: 66 MMHG | BODY MASS INDEX: 34.45 KG/M2 | TEMPERATURE: 97.2 F | SYSTOLIC BLOOD PRESSURE: 115 MMHG | HEIGHT: 63 IN | HEART RATE: 70 BPM | OXYGEN SATURATION: 99 % | WEIGHT: 194.45 LBS | RESPIRATION RATE: 16 BRPM

## 2024-05-02 DIAGNOSIS — G56.01 CARPAL TUNNEL SYNDROME ON RIGHT: ICD-10-CM

## 2024-05-02 DIAGNOSIS — G56.00 CARPAL TUNNEL SYNDROME DURING PREGNANCY (HHS-HCC): Primary | ICD-10-CM

## 2024-05-02 DIAGNOSIS — O26.899 CARPAL TUNNEL SYNDROME DURING PREGNANCY (HHS-HCC): Primary | ICD-10-CM

## 2024-05-02 LAB — PREGNANCY TEST URINE, POC: NEGATIVE

## 2024-05-02 PROCEDURE — 2500000005 HC RX 250 GENERAL PHARMACY W/O HCPCS: Performed by: ORTHOPAEDIC SURGERY

## 2024-05-02 PROCEDURE — 3600000003 HC OR TIME - INITIAL BASE CHARGE - PROCEDURE LEVEL THREE: Performed by: ORTHOPAEDIC SURGERY

## 2024-05-02 PROCEDURE — 3700000002 HC GENERAL ANESTHESIA TIME - EACH INCREMENTAL 1 MINUTE: Performed by: ORTHOPAEDIC SURGERY

## 2024-05-02 PROCEDURE — 7100000010 HC PHASE TWO TIME - EACH INCREMENTAL 1 MINUTE: Performed by: ORTHOPAEDIC SURGERY

## 2024-05-02 PROCEDURE — 7100000002 HC RECOVERY ROOM TIME - EACH INCREMENTAL 1 MINUTE: Performed by: ORTHOPAEDIC SURGERY

## 2024-05-02 PROCEDURE — 2500000005 HC RX 250 GENERAL PHARMACY W/O HCPCS: Performed by: NURSE ANESTHETIST, CERTIFIED REGISTERED

## 2024-05-02 PROCEDURE — 2500000004 HC RX 250 GENERAL PHARMACY W/ HCPCS (ALT 636 FOR OP/ED): Performed by: ORTHOPAEDIC SURGERY

## 2024-05-02 PROCEDURE — 3700000001 HC GENERAL ANESTHESIA TIME - INITIAL BASE CHARGE: Performed by: ORTHOPAEDIC SURGERY

## 2024-05-02 PROCEDURE — 3600000008 HC OR TIME - EACH INCREMENTAL 1 MINUTE - PROCEDURE LEVEL THREE: Performed by: ORTHOPAEDIC SURGERY

## 2024-05-02 PROCEDURE — A4217 STERILE WATER/SALINE, 500 ML: HCPCS | Performed by: ORTHOPAEDIC SURGERY

## 2024-05-02 PROCEDURE — A64721 PR REVISE MEDIAN N/CARPAL TUNNEL SURG: Performed by: NURSE ANESTHETIST, CERTIFIED REGISTERED

## 2024-05-02 PROCEDURE — 2500000004 HC RX 250 GENERAL PHARMACY W/ HCPCS (ALT 636 FOR OP/ED): Performed by: NURSE ANESTHETIST, CERTIFIED REGISTERED

## 2024-05-02 PROCEDURE — 7100000009 HC PHASE TWO TIME - INITIAL BASE CHARGE: Performed by: ORTHOPAEDIC SURGERY

## 2024-05-02 PROCEDURE — 7100000001 HC RECOVERY ROOM TIME - INITIAL BASE CHARGE: Performed by: ORTHOPAEDIC SURGERY

## 2024-05-02 PROCEDURE — A64721 PR REVISE MEDIAN N/CARPAL TUNNEL SURG: Performed by: ANESTHESIOLOGY

## 2024-05-02 PROCEDURE — 64721 CARPAL TUNNEL SURGERY: CPT | Performed by: ORTHOPAEDIC SURGERY

## 2024-05-02 RX ORDER — LIDOCAINE HYDROCHLORIDE 20 MG/ML
INJECTION, SOLUTION EPIDURAL; INFILTRATION; INTRACAUDAL; PERINEURAL AS NEEDED
Status: DISCONTINUED | OUTPATIENT
Start: 2024-05-02 | End: 2024-05-02

## 2024-05-02 RX ORDER — SODIUM CHLORIDE, SODIUM LACTATE, POTASSIUM CHLORIDE, CALCIUM CHLORIDE 600; 310; 30; 20 MG/100ML; MG/100ML; MG/100ML; MG/100ML
100 INJECTION, SOLUTION INTRAVENOUS CONTINUOUS
Status: DISCONTINUED | OUTPATIENT
Start: 2024-05-02 | End: 2024-05-02 | Stop reason: HOSPADM

## 2024-05-02 RX ORDER — SODIUM CHLORIDE, SODIUM LACTATE, POTASSIUM CHLORIDE, CALCIUM CHLORIDE 600; 310; 30; 20 MG/100ML; MG/100ML; MG/100ML; MG/100ML
INJECTION, SOLUTION INTRAVENOUS CONTINUOUS PRN
Status: DISCONTINUED | OUTPATIENT
Start: 2024-05-02 | End: 2024-05-02

## 2024-05-02 RX ORDER — SODIUM CHLORIDE 0.9 G/100ML
IRRIGANT IRRIGATION AS NEEDED
Status: DISCONTINUED | OUTPATIENT
Start: 2024-05-02 | End: 2024-05-02 | Stop reason: HOSPADM

## 2024-05-02 RX ORDER — LABETALOL HYDROCHLORIDE 5 MG/ML
5 INJECTION, SOLUTION INTRAVENOUS ONCE AS NEEDED
Status: DISCONTINUED | OUTPATIENT
Start: 2024-05-02 | End: 2024-05-02 | Stop reason: HOSPADM

## 2024-05-02 RX ORDER — CEFAZOLIN 1 G/1
INJECTION, POWDER, FOR SOLUTION INTRAVENOUS AS NEEDED
Status: DISCONTINUED | OUTPATIENT
Start: 2024-05-02 | End: 2024-05-02

## 2024-05-02 RX ORDER — LIDOCAINE HYDROCHLORIDE 10 MG/ML
0.1 INJECTION, SOLUTION EPIDURAL; INFILTRATION; INTRACAUDAL; PERINEURAL ONCE
Status: DISCONTINUED | OUTPATIENT
Start: 2024-05-02 | End: 2024-05-02 | Stop reason: HOSPADM

## 2024-05-02 RX ORDER — PROMETHAZINE HYDROCHLORIDE 25 MG/ML
6.25 INJECTION, SOLUTION INTRAMUSCULAR; INTRAVENOUS ONCE AS NEEDED
Status: DISCONTINUED | OUTPATIENT
Start: 2024-05-02 | End: 2024-05-02 | Stop reason: HOSPADM

## 2024-05-02 RX ORDER — CEFAZOLIN SODIUM 2 G/100ML
2 INJECTION, SOLUTION INTRAVENOUS ONCE
Status: DISCONTINUED | OUTPATIENT
Start: 2024-05-02 | End: 2024-05-02 | Stop reason: HOSPADM

## 2024-05-02 RX ORDER — HYDROCODONE BITARTRATE AND ACETAMINOPHEN 5; 325 MG/1; MG/1
1 TABLET ORAL EVERY 6 HOURS PRN
Qty: 12 TABLET | Refills: 0 | Status: SHIPPED | OUTPATIENT
Start: 2024-05-02 | End: 2024-05-09

## 2024-05-02 RX ORDER — ONDANSETRON HYDROCHLORIDE 2 MG/ML
INJECTION, SOLUTION INTRAVENOUS AS NEEDED
Status: DISCONTINUED | OUTPATIENT
Start: 2024-05-02 | End: 2024-05-02

## 2024-05-02 RX ORDER — MIDAZOLAM HYDROCHLORIDE 1 MG/ML
INJECTION INTRAMUSCULAR; INTRAVENOUS AS NEEDED
Status: DISCONTINUED | OUTPATIENT
Start: 2024-05-02 | End: 2024-05-02

## 2024-05-02 RX ORDER — ALBUTEROL SULFATE 0.83 MG/ML
2.5 SOLUTION RESPIRATORY (INHALATION) ONCE AS NEEDED
Status: DISCONTINUED | OUTPATIENT
Start: 2024-05-02 | End: 2024-05-02 | Stop reason: HOSPADM

## 2024-05-02 RX ORDER — PROPOFOL 10 MG/ML
INJECTION, EMULSION INTRAVENOUS CONTINUOUS PRN
Status: DISCONTINUED | OUTPATIENT
Start: 2024-05-02 | End: 2024-05-02

## 2024-05-02 RX ORDER — OXYCODONE HYDROCHLORIDE 5 MG/1
5 TABLET ORAL EVERY 4 HOURS PRN
Status: DISCONTINUED | OUTPATIENT
Start: 2024-05-02 | End: 2024-05-02 | Stop reason: HOSPADM

## 2024-05-02 RX ORDER — HYDRALAZINE HYDROCHLORIDE 20 MG/ML
5 INJECTION INTRAMUSCULAR; INTRAVENOUS EVERY 30 MIN PRN
Status: DISCONTINUED | OUTPATIENT
Start: 2024-05-02 | End: 2024-05-02 | Stop reason: HOSPADM

## 2024-05-02 RX ORDER — ONDANSETRON HYDROCHLORIDE 2 MG/ML
4 INJECTION, SOLUTION INTRAVENOUS ONCE AS NEEDED
Status: DISCONTINUED | OUTPATIENT
Start: 2024-05-02 | End: 2024-05-02 | Stop reason: HOSPADM

## 2024-05-02 RX ADMIN — CEFAZOLIN 2 G: 1 INJECTION, POWDER, FOR SOLUTION INTRAMUSCULAR; INTRAVENOUS at 14:33

## 2024-05-02 RX ADMIN — ONDANSETRON 4 MG: 2 INJECTION INTRAMUSCULAR; INTRAVENOUS at 14:45

## 2024-05-02 RX ADMIN — MIDAZOLAM HYDROCHLORIDE 2 MG: 1 INJECTION, SOLUTION INTRAMUSCULAR; INTRAVENOUS at 14:26

## 2024-05-02 RX ADMIN — PROPOFOL 40 MG: 10 INJECTION, EMULSION INTRAVENOUS at 14:32

## 2024-05-02 RX ADMIN — LIDOCAINE HYDROCHLORIDE 40 MG: 20 INJECTION, SOLUTION EPIDURAL; INFILTRATION; INTRACAUDAL; PERINEURAL at 14:30

## 2024-05-02 RX ADMIN — PROPOFOL 30 MG: 10 INJECTION, EMULSION INTRAVENOUS at 14:34

## 2024-05-02 RX ADMIN — PROPOFOL 100 MCG/KG/MIN: 10 INJECTION, EMULSION INTRAVENOUS at 14:30

## 2024-05-02 RX ADMIN — PROPOFOL 30 MG: 10 INJECTION, EMULSION INTRAVENOUS at 14:33

## 2024-05-02 RX ADMIN — SODIUM CHLORIDE, POTASSIUM CHLORIDE, SODIUM LACTATE AND CALCIUM CHLORIDE: 600; 310; 30; 20 INJECTION, SOLUTION INTRAVENOUS at 14:26

## 2024-05-02 ASSESSMENT — COLUMBIA-SUICIDE SEVERITY RATING SCALE - C-SSRS
6. HAVE YOU EVER DONE ANYTHING, STARTED TO DO ANYTHING, OR PREPARED TO DO ANYTHING TO END YOUR LIFE?: NO
2. HAVE YOU ACTUALLY HAD ANY THOUGHTS OF KILLING YOURSELF?: NO
1. IN THE PAST MONTH, HAVE YOU WISHED YOU WERE DEAD OR WISHED YOU COULD GO TO SLEEP AND NOT WAKE UP?: NO

## 2024-05-02 ASSESSMENT — PAIN - FUNCTIONAL ASSESSMENT: PAIN_FUNCTIONAL_ASSESSMENT: 0-10

## 2024-05-02 NOTE — H&P
History Of Present Illness  Raegan Ozuna is a 38 y.o. female presenting with right carpal tunnel syndrome.     Past Medical History  Past Medical History:   Diagnosis Date    Acute vaginitis 2015    Bacterial vaginosis    Autoimmune disorder (Multi)     Encounter for general adult medical examination without abnormal findings 2016    Health maintenance examination    Encounter for gynecological examination (general) (routine) without abnormal findings 2016    Visit for routine gyn exam    Encounter for gynecological examination (general) (routine) without abnormal findings 2016    Visit for routine gyn exam    Encounter for pregnancy test, result positive ([facility]) 2019    Positive pregnancy test    Encounter for pregnancy test, result positive ([facility]) 2019    Positive pregnancy test    Encounter for screening for malignant neoplasm of cervix 2015    Pap smear for cervical cancer screening    Encounter for supervision of normal pregnancy, unspecified, third trimester ([facility]) 2021    Third trimester pregnancy    Encounter for supervision of normal pregnancy, unspecified, third trimester ([facility]) 2021    Third trimester pregnancy    Encounter for supervision of normal pregnancy, unspecified, third trimester ([facility]) 2019    Pregnancy, obstetrical care, third trimester    Ganglion, right wrist 2016    Ganglion of right wrist    Incomplete spontaneous  without complication ([facility]) 2018    Incomplete     Other conditions influencing health status     Abnormal human chorionic gonadotropin (hCG)    Personal history of other complications of pregnancy, childbirth and the puerperium 09/10/2018    History of ectopic pregnancy    Personal history of other diseases of the female genital tract 2015    History of vaginal discharge    Personal history of other diseases of urinary system 2015    History of hematuria     Personal history of other mental and behavioral disorders 2016    History of depression    Personal history of other mental and behavioral disorders 2016    History of depression    Personal history of other mental and behavioral disorders 2016    History of depression    Personal history of other specified conditions 2016    History of insomnia    Supervision of pregnancy with history of ectopic pregnancy, first trimester ([facility]) 2019    Pregnancy with history of ectopic pregnancy, first trimester    Threatened  ([facility]) 2018    Threatened  in first trimester       Surgical History  Past Surgical History:   Procedure Laterality Date     SECTION, LOW TRANSVERSE      ELBOW FRACTURE SURGERY Left     OTHER SURGICAL HISTORY  2019     section low transverse    OTHER SURGICAL HISTORY  2016    History Of Prior Surgery    SALPINGECTOMY Right     TONSILLECTOMY  2016    Tonsillectomy    TONSILLECTOMY          Social History  She reports that she has never smoked. She has never used smokeless tobacco. She reports that she does not currently use alcohol. She reports that she does not use drugs.    Family History  Family History   Problem Relation Name Age of Onset    Other (bladder cancer) Father          Allergies  Patient has no known allergies.    Review of Systems   All other systems reviewed and are negative.       Physical Exam  Physical Examination Findings:  Constitutional: Appears well-developed and well-nourished.  Head: Normocephalic and atraumatic.  Eyes: Pupils are equal and round.  Cardiovascular: Intact distal pulses.   Respiratory: Effort normal. No respiratory distress.  Neurologic: Alert and oriented to person, place, and time.  Skin: Skin is warm and dry.  Hematologic / Lympahtic: No lymphedema, lymphangitis.  Psychiatric: normal mood and affect. Behavior is normal.   Musculoskeletal: Right hand with diminished sensation  "median nerve distribution and positive provocative maneuvers for carpal tunnel syndrome.      Last Recorded Vitals  Blood pressure 121/72, pulse 82, temperature 36 °C (96.8 °F), temperature source Temporal, resp. rate 16, height 1.6 m (5' 3\"), weight 88.2 kg (194 lb 7.1 oz), last menstrual period 02/01/2024, SpO2 100%, currently breastfeeding.         Assessment/Plan   Principal Problem:    Carpal tunnel syndrome on right      Will proceed with right carpal tunnel release as scheduled       Nate Kerns MD    "

## 2024-05-02 NOTE — ANESTHESIA POSTPROCEDURE EVALUATION
Patient: Raegan Ozuna    Procedure Summary       Date: 05/02/24 Room / Location: [facility] A OR 11 / Virtual U A OR    Anesthesia Start: 1426 Anesthesia Stop: 1457    Procedure: Right Carpal Tunnel Release (Right: Wrist) Diagnosis:       Carpal tunnel syndrome on right      (Carpal tunnel syndrome on right [G56.01])    Surgeons: Nate Kerns MD Responsible Provider: Shon Mathwe MD    Anesthesia Type: MAC ASA Status: 2            Anesthesia Type: MAC    Vitals Value Taken Time   /60 05/02/24 1517   Temp 36.5 °C (97.7 °F) 05/02/24 1455   Pulse 71 05/02/24 1518   Resp 15 05/02/24 1518   SpO2 98 % 05/02/24 1518   Vitals shown include unfiled device data.    Anesthesia Post Evaluation    Patient participation: complete - patient participated  Level of consciousness: awake  Pain management: adequate  Airway patency: patent  Cardiovascular status: acceptable and hemodynamically stable  Respiratory status: acceptable  Hydration status: acceptable  Postoperative Nausea and Vomiting: none        No notable events documented.

## 2024-05-02 NOTE — BRIEF OP NOTE
Date: 2024  OR Location: [facility] OR    Name: Raegan Ozuna, : 1985, Age: 38 y.o., MRN: [id number], Sex: female    Diagnosis  Pre-op Diagnosis     * Carpal tunnel syndrome on right [G56.01] Post-op Diagnosis     * Carpal tunnel syndrome on right [G56.01]     Procedures  Right Carpal Tunnel Release  03929 - [place] NEUROPLASTY &/TRANSPOS MEDIAN NRV CARPAL TUNNE      Surgeons      * Nate Kerns - Primary    Resident/Fellow/Other Assistant:  Surgeons and Role:     * Dat Yañez DO - Resident - Assisting    Procedure Summary  Anesthesia: Monitor Anesthesia Care  ASA: II  Anesthesia Staff: Anesthesiologist: Shon Mathew MD  CRNA: MADISON Shields-CRNA  Estimated Blood Loss: 2mL  Intra-op Medications:   Administrations occurring from 1400 to 1500 on 24:   Medication Name Total Dose   sodium chloride 0.9 % irrigation solution 1,000 mL   BUPivacaine HCl (Marcaine) 0.5 % (5 mg/mL) 30 mL, lidocaine (Xylocaine) 30 mL syringe 10 mL              Anesthesia Record               Intraprocedure I/O Totals          Intake    Propofol Drip 0.00 mL    The total shown is the total volume documented since Anesthesia Start was filed.    Total Intake 0 mL          Specimen: No specimens collected     Staff:   Circulator: Claudia Panchal RN  Relief Circulator: Lindsey Hernandez RN  Scrub Person: Anna Virgen          Findings: left median nerve with compression at carpal tunnel     Complications:  None; patient tolerated the procedure well.     Disposition: PACU - hemodynamically stable.  Condition: stable  Specimens Collected: No specimens collected  Attending Attestation: I was present and scrubbed for the entire procedure.    Nate Kerns  Phone Number: [phone]

## 2024-05-02 NOTE — SIGNIFICANT EVENT
Friend to the bedside.  Pt ambulated to bathroom with steady gait and return to room.  Dressed without assist.  Ayan well.  Discharge completed

## 2024-05-02 NOTE — OP NOTE
Right Carpal Tunnel Release (R) Operative Note     Date: 2024  OR Location: [facility] A OR    Name: Raegan Ozuna, : 1985, Age: 38 y.o., MRN: [id number], Sex: female    Diagnosis  Pre-op Diagnosis     * Carpal tunnel syndrome on right [G56.01] Post-op Diagnosis     * Carpal tunnel syndrome on right [G56.01]     Procedures  Right Carpal Tunnel Release  24853 - [place] NEUROPLASTY &/TRANSPOS MEDIAN NRV CARPAL TUNNE      Surgeons      * Nate Kerns - Primary    Resident/Fellow/Other Assistant:  Surgeons and Role:     * Dat Yañez DO - Resident - Assisting    Procedure Summary  Anesthesia: Monitor Anesthesia Care  ASA: II  Anesthesia Staff: Anesthesiologist: Shon Mathew MD  CRNA: MADISON Shields-CRNA  Estimated Blood Loss:   0 mL  Intra-op Medications:   Administrations occurring from 1400 to 1500 on 24:   Medication Name Total Dose   sodium chloride 0.9 % irrigation solution 1,000 mL   BUPivacaine HCl (Marcaine) 0.5 % (5 mg/mL) 30 mL, lidocaine (Xylocaine) 30 mL syringe 10 mL              Anesthesia Record               Intraprocedure I/O Totals          Intake    Propofol Drip 0.00 mL    The total shown is the total volume documented since Anesthesia Start was filed.    LR infusion 500.00 mL    Total Intake 500 mL          Specimen: No specimens collected     Staff:   Circulator: Claudia Panchal RN  Relief Circulator: Lindsey Hernandez RN  Scrub Person: Anna Virgen         Drains and/or Catheters: * None in log *    Tourniquet Times:     Total Tourniquet Time Documented:  Arm - Upper (Right) - 11 minutes  Total: Arm - Upper (Right) - 11 minutes      Implants:     Findings: Right carpal tunnel syndrome    Indications: Raegan Ozuna is an 38 y.o. female who is having surgery for Carpal tunnel syndrome on right [G56.01].        The patient was seen in the preoperative area. The risks, benefits, complications, treatment options, non-operative alternatives, expected recovery and outcomes were  discussed with the patient. The possibilities of reaction to medication, pulmonary aspiration, injury to surrounding structures, bleeding, recurrent infection, the need for additional procedures, failure to diagnose a condition, and creating a complication requiring transfusion or operation were discussed with the patient. The patient concurred with the proposed plan, giving informed consent.  The site of surgery was properly noted/marked if necessary per policy. The patient has been actively warmed in preoperative area. Preoperative antibiotics have been ordered and given within 1 hours of incision. Venous thrombosis prophylaxis have been ordered including bilateral sequential compression devices    Procedure Details:   38-year-old right-hand-dominant female with symptomatic carpal tunnel syndrome right upper extremity presents today for carpal tunnel release.  Preoperatively the right hand was identified and marked for surgery.  Informed consent process was completed.    Patient was brought to the operating room placed supine on the operating table.  Timeout procedure performed to verify correct patient procedure and operative site.  Intravenous antibiotics were administered.  After appropriate level of IV sedation been achieved local anesthetic was infiltrated the base of the right palm.  Right upper extremity prepped and draped in usual sterile fashion.  Limb was exsanguinated and tourniquet was inflated to 250 mmHg.    We made a longitudinal incision the central aspect of the proximal palm.  Sharp dissection was carried down through the superficial palmar fascia.  Deep retractors were placed.  Hypertrophic transverse carpal ligament was exposed.  Ligament was divided longitudinally along its ulnar margin.  Complete median nerve decompression was confirmed.  Median nerve hyperemia was demonstrated after nerve decompression.  Wound was irrigated and then closed in interrupted fashion.  A sterile bandage was  applied and the tourniquet was deflated.  The patient was transferred to the recovery in stable condition.    Postoperatively the patient will be discharged home once comfortable.  She can remove her bandage on postop day #4 and begin wound care with gentle activities as instructed.  Return to clinic in 2 weeks for wound check and advancement of her activities.        Complications:  None; patient tolerated the procedure well.    Disposition: PACU - hemodynamically stable.  Condition: stable         Additional Details:      Attending Attestation: I was present and scrubbed for the entire procedure.    Nate Kerns  Phone Number: [phone]

## 2024-05-02 NOTE — ANESTHESIA PREPROCEDURE EVALUATION
Patient: Raegan Ozuna    Procedure Information       Date/Time: 05/02/24 1400    Procedure: Right Carpal Tunnel Release (Right: Wrist)    Location: [facility] / Virtual [facility]    Surgeons: Nate Kerns MD          37 yo F hx PONV.  GERD only with pregnancy    Relevant Problems   Anesthesia   (+) PONV (postoperative nausea and vomiting)      Cardiac   (+) Breast pain      Neuro   (+) Carpal tunnel syndrome during pregnancy ([facility])   (+) Carpal tunnel syndrome on right      GI   (+) Gastroesophageal reflux disease without esophagitis      /Renal   (+) Urinary tract infection in mother during third trimester of pregnancy ([facility]-HCC)      Endocrine   (+) Gestational diabetes mellitus (GDM) in third trimester ([facility])   (+) Obesity in pregnancy ([facility])      Musculoskeletal   (+) Carpal tunnel syndrome during pregnancy ([facility])   (+) Carpal tunnel syndrome on right      ID   (+) Urinary tract infection in mother during third trimester of pregnancy ([facility])      GYN   (+) 38 weeks gestation of pregnancy ([facility])   (+) Supervision of high risk pregnancy in third trimester ([facility])       Clinical information reviewed:   Tobacco  Allergies  Meds   Med Hx  Surg Hx  OB Status  Fam Hx  Soc   Hx        NPO Detail:  NPO/Void Status  Date of Last Liquid: 05/02/24  Time of Last Liquid: 1100  Date of Last Solid: 05/01/24  Time of Last Solid: 2200         Physical Exam    Airway  Mallampati: III  TM distance: >3 FB  Neck ROM: full     Cardiovascular   Rate: normal     Dental   Comments: Upper partial   Pulmonary - normal exam     Abdominal            Anesthesia Plan    History of general anesthesia?: yes  History of complications of general anesthesia?: no    ASA 2     MAC     intravenous induction   Anesthetic plan and risks discussed with patient.

## 2024-05-20 ENCOUNTER — OFFICE VISIT (OUTPATIENT)
Dept: ORTHOPEDIC SURGERY | Facility: CLINIC | Age: 39
End: 2024-05-20
Payer: COMMERCIAL

## 2024-05-20 VITALS — HEIGHT: 63 IN | BODY MASS INDEX: 34.38 KG/M2 | WEIGHT: 194 LBS

## 2024-05-20 DIAGNOSIS — G56.01 CARPAL TUNNEL SYNDROME OF RIGHT WRIST: Primary | ICD-10-CM

## 2024-05-20 PROCEDURE — 1036F TOBACCO NON-USER: CPT | Performed by: PHYSICIAN ASSISTANT

## 2024-05-20 PROCEDURE — 99024 POSTOP FOLLOW-UP VISIT: CPT | Performed by: PHYSICIAN ASSISTANT

## 2024-05-20 ASSESSMENT — PAIN - FUNCTIONAL ASSESSMENT: PAIN_FUNCTIONAL_ASSESSMENT: NO/DENIES PAIN

## 2024-05-20 NOTE — PROGRESS NOTES
Raegan returns to clinic today for first postoperative visit after right carpal tunnel decompression performed on 5/2/2024.  Overall she is doing well and has noticed a lot of improvement of her sensory changes she however has some discomfort over the volar aspect of the wrist.    Examination: Surgical incision is healing well there is no surrounding erythema active drainage or signs of any active infection no open areas.  Fingers are warm well-perfused she has full digital finger range of motion good wrist flexion and extension.    Impression: Right carpal tunnel syndrome    Plan: We discussed lotion application and scar tissue massage techniques.  She may slowly start to advance her activities as she feels comfortable.  We did discuss the discomfort that she is experiencing in the wrist is something that should continue to improve she will continue to monitor symptoms if she does not get improvement or worsening symptoms occur she will return to our office.    Belen Anton PA-C  Department of Orthopaedic Surgery  [facility]    Dictation performed with the use of voice recognition software. Syntax and grammatical errors may exist.

## 2024-05-31 ENCOUNTER — APPOINTMENT (OUTPATIENT)
Dept: PRIMARY CARE | Facility: CLINIC | Age: 39
End: 2024-05-31
Payer: COMMERCIAL

## 2024-07-12 ENCOUNTER — APPOINTMENT (OUTPATIENT)
Dept: PRIMARY CARE | Facility: CLINIC | Age: 39
End: 2024-07-12
Payer: COMMERCIAL

## 2024-07-12 VITALS
HEART RATE: 65 BPM | DIASTOLIC BLOOD PRESSURE: 57 MMHG | SYSTOLIC BLOOD PRESSURE: 102 MMHG | OXYGEN SATURATION: 96 % | HEIGHT: 64 IN | WEIGHT: 198 LBS | BODY MASS INDEX: 33.8 KG/M2 | TEMPERATURE: 98 F

## 2024-07-12 DIAGNOSIS — E55.9 VITAMIN D DEFICIENCY: ICD-10-CM

## 2024-07-12 DIAGNOSIS — O24.414 INSULIN CONTROLLED GESTATIONAL DIABETES MELLITUS (GDM) IN THIRD TRIMESTER (HHS-HCC): ICD-10-CM

## 2024-07-12 DIAGNOSIS — Q65.89 ACETABULAR DYSPLASIA (HHS-HCC): ICD-10-CM

## 2024-07-12 DIAGNOSIS — M25.552 LEFT HIP PAIN: ICD-10-CM

## 2024-07-12 DIAGNOSIS — R73.9 HYPERGLYCEMIA: ICD-10-CM

## 2024-07-12 DIAGNOSIS — Z00.00 ANNUAL PHYSICAL EXAM: Primary | ICD-10-CM

## 2024-07-12 PROBLEM — R00.0 TACHYCARDIA: Status: ACTIVE | Noted: 2024-07-12

## 2024-07-12 PROBLEM — F41.9 ANXIETY: Status: ACTIVE | Noted: 2023-04-26

## 2024-07-12 PROBLEM — O36.80X0 PREGNANCY WITH UNCERTAIN FETAL VIABILITY (HHS-HCC): Status: ACTIVE | Noted: 2024-07-12

## 2024-07-12 PROBLEM — R31.9 HEMATURIA: Status: ACTIVE | Noted: 2024-07-12

## 2024-07-12 PROBLEM — O34.219 DELIVERED BY CESAREAN DELIVERY FOLLOWING PREVIOUS CESAREAN DELIVERY (HHS-HCC): Status: RESOLVED | Noted: 2023-03-11 | Resolved: 2024-07-12

## 2024-07-12 PROBLEM — T78.40XA ALLERGIC RASH PRESENT ON EXAMINATION: Status: RESOLVED | Noted: 2023-03-11 | Resolved: 2024-07-12

## 2024-07-12 PROBLEM — O99.891 BACK PAIN AFFECTING PREGNANCY IN THIRD TRIMESTER (HHS-HCC): Status: RESOLVED | Noted: 2023-03-11 | Resolved: 2024-07-12

## 2024-07-12 PROBLEM — Z20.822 CONTACT WITH AND (SUSPECTED) EXPOSURE TO COVID-19: Status: ACTIVE | Noted: 2023-07-14

## 2024-07-12 PROBLEM — D84.9: Status: RESOLVED | Noted: 2023-03-11 | Resolved: 2024-07-12

## 2024-07-12 PROBLEM — M54.9 BACK PAIN AFFECTING PREGNANCY IN THIRD TRIMESTER (HHS-HCC): Status: RESOLVED | Noted: 2023-03-11 | Resolved: 2024-07-12

## 2024-07-12 PROBLEM — J18.9 PNEUMONIA DUE TO INFECTIOUS ORGANISM: Status: ACTIVE | Noted: 2023-05-16

## 2024-07-12 PROBLEM — N30.90 CYSTITIS: Status: ACTIVE | Noted: 2023-07-21

## 2024-07-12 PROBLEM — O09.529 MULTIGRAVIDA OF ADVANCED MATERNAL AGE (HHS-HCC): Status: ACTIVE | Noted: 2023-11-27

## 2024-07-12 PROBLEM — N64.4 BREAST PAIN: Status: RESOLVED | Noted: 2023-03-11 | Resolved: 2024-07-12

## 2024-07-12 PROBLEM — R50.9 FEVER: Status: ACTIVE | Noted: 2024-07-12

## 2024-07-12 PROBLEM — O20.9 BLEEDING IN EARLY PREGNANCY (HHS-HCC): Status: RESOLVED | Noted: 2023-03-11 | Resolved: 2024-07-12

## 2024-07-12 PROBLEM — O36.61X0: Status: ACTIVE | Noted: 2024-07-12

## 2024-07-12 PROBLEM — R10.31 RIGHT LOWER QUADRANT ABDOMINAL PAIN: Status: ACTIVE | Noted: 2024-07-12

## 2024-07-12 ASSESSMENT — PATIENT HEALTH QUESTIONNAIRE - PHQ9
1. LITTLE INTEREST OR PLEASURE IN DOING THINGS: NOT AT ALL
SUM OF ALL RESPONSES TO PHQ QUESTIONS 1-9: 0
6. FEELING BAD ABOUT YOURSELF - OR THAT YOU ARE A FAILURE OR HAVE LET YOURSELF OR YOUR FAMILY DOWN: NOT AT ALL
SUM OF ALL RESPONSES TO PHQ9 QUESTIONS 1 AND 2: 0
3. TROUBLE FALLING OR STAYING ASLEEP OR SLEEPING TOO MUCH: NOT AT ALL
5. POOR APPETITE OR OVEREATING: NOT AT ALL
9. THOUGHTS THAT YOU WOULD BE BETTER OFF DEAD, OR OF HURTING YOURSELF: NOT AT ALL
7. TROUBLE CONCENTRATING ON THINGS, SUCH AS READING THE NEWSPAPER OR WATCHING TELEVISION: NOT AT ALL
10. IF YOU CHECKED OFF ANY PROBLEMS, HOW DIFFICULT HAVE THESE PROBLEMS MADE IT FOR YOU TO DO YOUR WORK, TAKE CARE OF THINGS AT HOME, OR GET ALONG WITH OTHER PEOPLE: NOT DIFFICULT AT ALL
2. FEELING DOWN, DEPRESSED OR HOPELESS: NOT AT ALL
4. FEELING TIRED OR HAVING LITTLE ENERGY: NOT AT ALL
8. MOVING OR SPEAKING SO SLOWLY THAT OTHER PEOPLE COULD HAVE NOTICED. OR THE OPPOSITE, BEING SO FIGETY OR RESTLESS THAT YOU HAVE BEEN MOVING AROUND A LOT MORE THAN USUAL: NOT AT ALL

## 2024-07-12 ASSESSMENT — ANXIETY QUESTIONNAIRES
2. NOT BEING ABLE TO STOP OR CONTROL WORRYING: NOT AT ALL
7. FEELING AFRAID AS IF SOMETHING AWFUL MIGHT HAPPEN: NOT AT ALL
5. BEING SO RESTLESS THAT IT IS HARD TO SIT STILL: SEVERAL DAYS
6. BECOMING EASILY ANNOYED OR IRRITABLE: NOT AT ALL
IF YOU CHECKED OFF ANY PROBLEMS ON THIS QUESTIONNAIRE, HOW DIFFICULT HAVE THESE PROBLEMS MADE IT FOR YOU TO DO YOUR WORK, TAKE CARE OF THINGS AT HOME, OR GET ALONG WITH OTHER PEOPLE: NOT DIFFICULT AT ALL
4. TROUBLE RELAXING: SEVERAL DAYS
3. WORRYING TOO MUCH ABOUT DIFFERENT THINGS: NOT AT ALL
GAD7 TOTAL SCORE: 2
1. FEELING NERVOUS, ANXIOUS, OR ON EDGE: NOT AT ALL

## 2024-07-12 NOTE — PROGRESS NOTES
"Subjective   Patient ID: Raegan Ozuna is a 38 y.o. female who presents for Annual Exam.    HPI   The patient presents to the clinic for an annual physical exam. She has past medical history of tachycardia, spontaneous ecchymosis, gestational diabetes mellitus, GERD, anxiety, carpal tunnel syndrome, and contact dermatitis.    The patient reports lower abdomen not symmetrical (associated with recent birth of her son ~7 months ago).  She has had 3 C/S.  Last in Dec.  She also has some diathesis in upper abdom muscles, some tenting when does sit up.  She has no abdominal pain.  She does not like how it looks     The patient reports irregular periods recently. Since the birth of her son in December 2023, she states that she has had 2 regular periods and missed some periods than her subsequent periods have been late. She has not been on birth control medication since 2018. Notably, she was breast-feeding her son up until ~2-months ago.    She recalls that she suffered from gestational diabetes during her recent pregnancy. Her blood sugars returned to normal levels after birth of her child.  She was suppose to have bs and hgba1c checked and never did it and would like to do it now.  No s/s of dm per pt    She has history of left hip pain. She may need a left hip replacement in the future. She states that her left hip pain is manageable right now. Her pain symptoms improved massively after previous physical therapy sessions for treatment of left hip pain.    She is not on any regular prescription medication at this time.     She has family history of breast cancer with her great grandmother (diagnosed in her 80s).    Her blood pressure (102/57) was on the lower end of normal range when checked in the clinic today.    Her most recent gynecological exam was done in April 2022.    Review of Systems    Objective   /57   Pulse 65   Temp 36.7 °C (98 °F)   Ht 1.613 m (5' 3.5\")   Wt 89.8 kg (198 lb)   LMP 07/11/2024   " SpO2 96%   BMI 34.52 kg/m²     Physical Exam  Constitutional:       Appearance: Normal appearance.   HENT:      Head: Normocephalic.      Right Ear: Tympanic membrane normal.      Left Ear: Tympanic membrane normal.      Mouth/Throat:      Pharynx: Oropharynx is clear.   Neck:      Comments: No thyromegaly  Cardiovascular:      Rate and Rhythm: Normal rate and regular rhythm.      Pulses: Normal pulses.      Heart sounds: Normal heart sounds.   Pulmonary:      Effort: Pulmonary effort is normal.      Breath sounds: Normal breath sounds.   Abdominal:      General: Bowel sounds are normal.      Palpations: Abdomen is soft. There is no mass.      Tenderness: There is no abdominal tenderness.   Musculoskeletal:         General: No deformity. Normal range of motion.      Cervical back: Normal range of motion.      Right lower leg: No edema.      Left lower leg: No edema.   Lymphadenopathy:      Cervical: No cervical adenopathy.   Skin:     General: Skin is warm and dry.   Neurological:      General: No focal deficit present.      Mental Status: She is alert and oriented to person, place, and time.   Psychiatric:         Mood and Affect: Mood normal.         Behavior: Behavior normal.         Thought Content: Thought content normal.         Judgment: Judgment normal.         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    Gestational diabetes mellitus (GDM) in third trimester ([facility]) O24.419    Hyperglycemia R73.9    Relevant Orders    Hemoglobin A1C     Other Visit Diagnoses         Codes    Annual physical exam    -  Primary Z00.00    Relevant Orders    CBC    Comprehensive Metabolic Panel    Lipid Panel    TSH with reflex to Free T4 if abnormal    Vitamin D deficiency     E55.9    Relevant Orders    Vitamin D 25-Hydroxy,Total (for eval of Vitamin D levels)               Labs (CBC, CMP, lipid panel, A1C, TSH, vitamin  D) were ordered for the patient. She intends to complete her labs soon. The clinic will contact the patient upon receiving her lab results.    In regards to concerns with irregular periods, the patient was advised that it is common to have irregular periods when breast-feeding. She had just stopped breast-feeding her child ~2 months ago. Her periods should become more regular in the near future. For now, continue monitoring symptoms for improvement/exacerbation.  If not then will follow up with gyn.  She was on ocp in the past also    A comprehensive physical exam was conducted on the patient in the clinic today.     Discussed diet and exercise for BS, has FH and hx of gestationaldm    Scribe Attestation  By signing my name below, I, Bhavana Hong , Scribe   attest that this documentation has been prepared under the direction and in the presence of Cat Reddy DO.

## 2024-07-15 ENCOUNTER — LAB (OUTPATIENT)
Dept: LAB | Facility: LAB | Age: 39
End: 2024-07-15
Payer: COMMERCIAL

## 2024-07-15 ENCOUNTER — APPOINTMENT (OUTPATIENT)
Dept: DERMATOLOGY | Facility: CLINIC | Age: 39
End: 2024-07-15
Payer: COMMERCIAL

## 2024-07-15 DIAGNOSIS — E55.9 VITAMIN D DEFICIENCY: ICD-10-CM

## 2024-07-15 DIAGNOSIS — Z12.83 ENCOUNTER FOR SCREENING FOR MALIGNANT NEOPLASM OF SKIN: Primary | ICD-10-CM

## 2024-07-15 DIAGNOSIS — O36.60X0 EXCESSIVE FETAL GROWTH AFFECTING MANAGEMENT OF PREGNANCY, ANTEPARTUM, SINGLE OR UNSPECIFIED FETUS (HHS-HCC): ICD-10-CM

## 2024-07-15 DIAGNOSIS — N85.A UTERINE SCAR FROM PREVIOUS CESAREAN DELIVERY: ICD-10-CM

## 2024-07-15 DIAGNOSIS — R73.9 HYPERGLYCEMIA: ICD-10-CM

## 2024-07-15 DIAGNOSIS — O24.414 INSULIN CONTROLLED GESTATIONAL DIABETES MELLITUS (GDM) IN THIRD TRIMESTER (HHS-HCC): ICD-10-CM

## 2024-07-15 DIAGNOSIS — D22.9 MELANOCYTIC NEVUS, UNSPECIFIED LOCATION: ICD-10-CM

## 2024-07-15 DIAGNOSIS — L81.4 LENTIGO: ICD-10-CM

## 2024-07-15 DIAGNOSIS — L30.9 DERMATITIS, UNSPECIFIED: ICD-10-CM

## 2024-07-15 DIAGNOSIS — L82.1 SEBORRHEIC KERATOSIS: ICD-10-CM

## 2024-07-15 DIAGNOSIS — Z00.00 ANNUAL PHYSICAL EXAM: ICD-10-CM

## 2024-07-15 LAB
25(OH)D3 SERPL-MCNC: 34 NG/ML (ref 30–100)
ALBUMIN SERPL BCP-MCNC: 4.4 G/DL (ref 3.4–5)
ALP SERPL-CCNC: 77 U/L (ref 33–110)
ALT SERPL W P-5'-P-CCNC: 14 U/L (ref 7–45)
ANION GAP SERPL CALC-SCNC: 11 MMOL/L (ref 10–20)
AST SERPL W P-5'-P-CCNC: 15 U/L (ref 9–39)
BASOPHILS # BLD AUTO: 0.05 X10*3/UL (ref 0–0.1)
BASOPHILS NFR BLD AUTO: 1 %
BILIRUB SERPL-MCNC: 0.5 MG/DL (ref 0–1.2)
BUN SERPL-MCNC: 16 MG/DL (ref 6–23)
CALCIUM SERPL-MCNC: 9.1 MG/DL (ref 8.6–10.6)
CHLORIDE SERPL-SCNC: 106 MMOL/L (ref 98–107)
CHOLEST SERPL-MCNC: 123 MG/DL (ref 0–199)
CHOLESTEROL/HDL RATIO: 2.4
CO2 SERPL-SCNC: 26 MMOL/L (ref 21–32)
CREAT SERPL-MCNC: 0.68 MG/DL (ref 0.5–1.05)
EGFRCR SERPLBLD CKD-EPI 2021: >90 ML/MIN/1.73M*2
EOSINOPHIL # BLD AUTO: 0.17 X10*3/UL (ref 0–0.7)
EOSINOPHIL NFR BLD AUTO: 3.3 %
ERYTHROCYTE [DISTWIDTH] IN BLOOD BY AUTOMATED COUNT: 12 % (ref 11.5–14.5)
EST. AVERAGE GLUCOSE BLD GHB EST-MCNC: 97 MG/DL
GLUCOSE SERPL-MCNC: 92 MG/DL (ref 74–99)
HBA1C MFR BLD: 5 %
HCT VFR BLD AUTO: 40.6 % (ref 36–46)
HDLC SERPL-MCNC: 51 MG/DL
HGB BLD-MCNC: 13.2 G/DL (ref 12–16)
IMM GRANULOCYTES # BLD AUTO: 0.02 X10*3/UL (ref 0–0.7)
IMM GRANULOCYTES NFR BLD AUTO: 0.4 % (ref 0–0.9)
LDLC SERPL CALC-MCNC: 62 MG/DL
LYMPHOCYTES # BLD AUTO: 1.76 X10*3/UL (ref 1.2–4.8)
LYMPHOCYTES NFR BLD AUTO: 33.8 %
MCH RBC QN AUTO: 27.8 PG (ref 26–34)
MCHC RBC AUTO-ENTMCNC: 32.5 G/DL (ref 32–36)
MCV RBC AUTO: 86 FL (ref 80–100)
MONOCYTES # BLD AUTO: 0.48 X10*3/UL (ref 0.1–1)
MONOCYTES NFR BLD AUTO: 9.2 %
NEUTROPHILS # BLD AUTO: 2.73 X10*3/UL (ref 1.2–7.7)
NEUTROPHILS NFR BLD AUTO: 52.3 %
NON HDL CHOLESTEROL: 72 MG/DL (ref 0–149)
NRBC BLD-RTO: 0 /100 WBCS (ref 0–0)
PLATELET # BLD AUTO: 269 X10*3/UL (ref 150–450)
POTASSIUM SERPL-SCNC: 4.1 MMOL/L (ref 3.5–5.3)
PROT SERPL-MCNC: 6.9 G/DL (ref 6.4–8.2)
RBC # BLD AUTO: 4.74 X10*6/UL (ref 4–5.2)
SODIUM SERPL-SCNC: 139 MMOL/L (ref 136–145)
TRIGL SERPL-MCNC: 48 MG/DL (ref 0–149)
TSH SERPL-ACNC: 1.57 MIU/L (ref 0.44–3.98)
VLDL: 10 MG/DL (ref 0–40)
WBC # BLD AUTO: 5.2 X10*3/UL (ref 4.4–11.3)

## 2024-07-15 PROCEDURE — 99203 OFFICE O/P NEW LOW 30 MIN: CPT | Performed by: NURSE PRACTITIONER

## 2024-07-15 PROCEDURE — 80061 LIPID PANEL: CPT

## 2024-07-15 PROCEDURE — 3044F HG A1C LEVEL LT 7.0%: CPT | Performed by: NURSE PRACTITIONER

## 2024-07-15 PROCEDURE — 3048F LDL-C <100 MG/DL: CPT | Performed by: NURSE PRACTITIONER

## 2024-07-15 PROCEDURE — 84443 ASSAY THYROID STIM HORMONE: CPT

## 2024-07-15 PROCEDURE — 1036F TOBACCO NON-USER: CPT | Performed by: NURSE PRACTITIONER

## 2024-07-15 PROCEDURE — 82306 VITAMIN D 25 HYDROXY: CPT

## 2024-07-15 PROCEDURE — 85025 COMPLETE CBC W/AUTO DIFF WBC: CPT

## 2024-07-15 PROCEDURE — 83036 HEMOGLOBIN GLYCOSYLATED A1C: CPT

## 2024-07-15 PROCEDURE — 36415 COLL VENOUS BLD VENIPUNCTURE: CPT

## 2024-07-15 PROCEDURE — 80053 COMPREHEN METABOLIC PANEL: CPT

## 2024-07-15 ASSESSMENT — DERMATOLOGY QUALITY OF LIFE (QOL) ASSESSMENT
WHAT SINGLE SKIN CONDITION LISTED BELOW IS THE PATIENT ANSWERING THE QUALITY-OF-LIFE ASSESSMENT QUESTIONS ABOUT: NONE OF THE ABOVE
RATE HOW BOTHERED YOU ARE BY EFFECTS OF YOUR SKIN PROBLEMS ON YOUR ACTIVITIES (EG, GOING OUT, ACCOMPLISHING WHAT YOU WANT, WORK ACTIVITIES OR YOUR RELATIONSHIPS WITH OTHERS): 0 - NEVER BOTHERED
RATE HOW BOTHERED YOU ARE BY SYMPTOMS OF YOUR SKIN PROBLEM (EG, ITCHING, STINGING BURNING, HURTING OR SKIN IRRITATION): 0 - NEVER BOTHERED
RATE HOW EMOTIONALLY BOTHERED YOU ARE BY YOUR SKIN PROBLEM (FOR EXAMPLE, WORRY, EMBARRASSMENT, FRUSTRATION): 0 - NEVER BOTHERED
DATE THE QUALITY-OF-LIFE ASSESSMENT WAS COMPLETED: 67035
WHAT SINGLE SKIN CONDITION LISTED BELOW IS THE PATIENT ANSWERING THE QUALITY-OF-LIFE ASSESSMENT QUESTIONS ABOUT: NONE OF THE ABOVE
RATE HOW BOTHERED YOU ARE BY SYMPTOMS OF YOUR SKIN PROBLEM (EG, ITCHING, STINGING BURNING, HURTING OR SKIN IRRITATION): 0 - NEVER BOTHERED
DATE THE QUALITY-OF-LIFE ASSESSMENT WAS COMPLETED: 07/14/2024
RATE HOW BOTHERED YOU ARE BY EFFECTS OF YOUR SKIN PROBLEMS ON YOUR ACTIVITIES (EG, GOING OUT, ACCOMPLISHING WHAT YOU WANT, WORK ACTIVITIES OR YOUR RELATIONSHIPS WITH OTHERS): 0 - NEVER BOTHERED
RATE HOW EMOTIONALLY BOTHERED YOU ARE BY YOUR SKIN PROBLEM (FOR EXAMPLE, WORRY, EMBARRASSMENT, FRUSTRATION): 0 - NEVER BOTHERED

## 2024-07-15 ASSESSMENT — PATIENT GLOBAL ASSESSMENT (PGA): WHAT IS THE PGA: PATIENT GLOBAL ASSESSMENT:  1 - CLEAR

## 2024-07-15 NOTE — PROGRESS NOTES
Subjective   Raegan Ozuna is a 38 y.o. female who presents for the following: Skin Check.  Skin Cancer Screening  She has a history of moderate sun exposure. She is in the sun occasionally. She uses sunscreen occasionally. She reports no skin symptoms. Her moles are not changing.          Objective   Well appearing patient in no apparent distress; mood and affect are within normal limits.    A full examination was performed including scalp, head, eyes, ears, nose, lips, neck, chest, axillae, abdomen, back, buttocks, bilateral upper extremities, bilateral lower extremities, hands, feet, fingers, toes, fingernails, and toenails. All findings within normal limits unless otherwise noted below.    Scattered benign lesions    Uniform pigmented macule(s)/papule(s) with reassuring findings on dermoscopy    Stuck on verrucous, tan-brown papules and plaques.      Scattered tan macules in sun-exposed areas.    Left Foot - Posterior, Right Foot - Posterior  Mild scale and maceration in web spaces. Recurrent fissure in plantar hallux of toe      Assessment/Plan   Encounter for screening for malignant neoplasm of skin    - Protective measures, such as avoiding skin exposure to sunlight during peak sun hours (10 AM to 3 PM), wearing protective clothing, and applying high-SPF sunscreen, are essential for reducing exposure to harmful ultraviolet (UV) light.  - Monthly self-examination of the skin is helpful to detect new lesions or changes in existing lesions.  - Discussed signs and symptoms of sun-related skin cancers.   - Make sure your moles are not signs of skin cancer (melanoma). Remember the ABCDEs of melanoma lesions:  A - Asymmetry: One half of the lesion does not mirror the other half.  B - Border: The borders are irregular or vague (indistinct).  C - Color: More than one color may be noted within the mole.  D - Diameter: Size greater than 6 mm (roughly the size of a pencil eraser) may be concerning.  E - Evolving: Notable  changes in the lesion over time are suspicious signs for skin cancer.    Melanocytic nevus, unspecified location    -Discussed nature of condition  -Reassurance, benign-appearing features on examination today  -Recommend continued observation    Seborrheic keratosis    Although Seborrheic Keratoses can be troublesome and unsightly, they are entirely benign.  Removal of Seborrheic Keratoses is considered a cosmetic procedure. Removal is typically performed using liquid nitrogen cryotherapy.  Treatment of current lesions does not prevent the development of new Seborrheic Keratoses in the future.    Lentigo    A solar lentigo (plural, solar lentigines), sometimes called an age spot or liver spot, is a brown macule (small, flat, smooth area of skin) caused by chronic sun or artificial ultraviolet (UV) light exposure. There may be just one lentigo or there may be multiple. This type of lentigo is different from lentigo simplex (discussed separately) because it is caused by exposure to UV light. Solar lentigines are benign, but they do indicate excessive sun exposure, a risk factor for the development of skin cancer.  Lesions are benign, no treatment needed.     Dermatitis, unspecified  Left Foot - Posterior; Right Foot - Posterior    -Discussed nature of diagnosis and treatment options  -When the rash is active, apply topical corticosteroids to the active areas of the rash as prescribed  -Recommend to use liberal emollients twice daily, one time applied immediately after shower while skin is still slightly damp. Use emollients to all areas of the body that may be affected and use whether the rash is active or not. Use prescription medications before applying emollients.  -Discussed with/information given to the patient on the risks, benefits and alternatives of the usage of topical corticosteroids, including but not limited to: atrophy (thinning of the skin), striae (stretch marks), telangiectasia (blood vessel growth),  and dyspigmentation (discoloration of the skin).  -Recommend to limit long-term use of topical corticosteroids to less than 14 days per month to reduce risk of side effects.  -Recommend: With flares, start clobetasol cream, use as directed and only as needed.   - Risks, benefits, and side effects discussed. Patient understood and agrees with the plan.       Follow up in 12 months for a total body skin exam. Please call me if there are any changes or development of concerning symptoms (lesion/skin condition is changing, bleeding, enlarging, or worsening).

## 2024-07-16 RX ORDER — CLOBETASOL PROPIONATE 0.5 MG/G
CREAM TOPICAL 2 TIMES DAILY PRN
Qty: 30 G | Refills: 0 | Status: SHIPPED | OUTPATIENT
Start: 2024-07-16 | End: 2024-07-30

## 2024-11-06 DIAGNOSIS — Z82.49 FAMILY HISTORY OF CEREBRAL ANEURYSM: Primary | ICD-10-CM

## 2024-11-07 ENCOUNTER — HOSPITAL ENCOUNTER (OUTPATIENT)
Dept: RADIOLOGY | Facility: HOSPITAL | Age: 39
Discharge: HOME | End: 2024-11-07
Payer: COMMERCIAL

## 2024-11-07 DIAGNOSIS — Z82.49 FAMILY HISTORY OF CEREBRAL ANEURYSM: ICD-10-CM

## 2024-11-07 PROCEDURE — 70496 CT ANGIOGRAPHY HEAD: CPT

## 2024-11-07 PROCEDURE — 70496 CT ANGIOGRAPHY HEAD: CPT | Performed by: RADIOLOGY

## 2024-11-07 PROCEDURE — 2550000001 HC RX 255 CONTRASTS: Performed by: FAMILY MEDICINE

## 2024-11-08 ENCOUNTER — APPOINTMENT (OUTPATIENT)
Dept: RADIOLOGY | Facility: HOSPITAL | Age: 39
End: 2024-11-08
Payer: COMMERCIAL

## 2024-11-14 ENCOUNTER — TELEPHONE (OUTPATIENT)
Dept: PRIMARY CARE | Facility: CLINIC | Age: 39
End: 2024-11-14
Payer: COMMERCIAL

## 2024-11-14 NOTE — TELEPHONE ENCOUNTER
----- Message from Cat Reddy sent at 11/10/2024  7:36 PM EST -----  Report to pt there is no CT evidence of  intracranial aneurysm

## 2024-12-03 ENCOUNTER — PHARMACY VISIT (OUTPATIENT)
Dept: PHARMACY | Facility: CLINIC | Age: 39
End: 2024-12-03
Payer: COMMERCIAL

## 2024-12-03 ENCOUNTER — TELEMEDICINE (OUTPATIENT)
Dept: PRIMARY CARE | Facility: CLINIC | Age: 39
End: 2024-12-03
Payer: COMMERCIAL

## 2024-12-03 DIAGNOSIS — B00.1 COLD SORE: Primary | ICD-10-CM

## 2024-12-03 PROCEDURE — RXMED WILLOW AMBULATORY MEDICATION CHARGE

## 2024-12-03 PROCEDURE — 99214 OFFICE O/P EST MOD 30 MIN: CPT | Performed by: NURSE PRACTITIONER

## 2024-12-03 PROCEDURE — 3048F LDL-C <100 MG/DL: CPT | Performed by: NURSE PRACTITIONER

## 2024-12-03 PROCEDURE — 3044F HG A1C LEVEL LT 7.0%: CPT | Performed by: NURSE PRACTITIONER

## 2024-12-03 RX ORDER — VALACYCLOVIR HYDROCHLORIDE 1 G/1
2000 TABLET, FILM COATED ORAL EVERY 12 HOURS
Qty: 4 TABLET | Refills: 0 | Status: SHIPPED | OUTPATIENT
Start: 2024-12-03 | End: 2024-12-04

## 2024-12-03 ASSESSMENT — ENCOUNTER SYMPTOMS
VOMITING: 0
FEVER: 0
SORE THROAT: 0
NAUSEA: 0

## 2024-12-03 NOTE — PROGRESS NOTES
Subjective   Patient ID: Raegan Ozuna is a 38 y.o. female who presents for a Virtual Visit Mouth Lesions.  Mouth Lesions   The current episode started 5 to 7 days ago. The onset was gradual. The problem occurs continuously. The problem has been unchanged. The problem is moderate. Nothing relieves the symptoms. Exacerbated by: placing invisalign. Associated symptoms include mouth sores. Pertinent negatives include no fever, no nausea, no vomiting, no congestion, no sore throat and no URI.   Endorse 3 blisters to upper lip, has applied Lysine topical, blister patches with no resolution. Notes pain when opening mouth wide to place dental  in mouth. Now with pain, discharge  PMH of cold sores, last outbreak 8-9 years ago resolvd with acyclovir  Endorses recent stressors    Review of Systems   Constitutional:  Negative for fever.   HENT:  Positive for mouth sores. Negative for congestion and sore throat.    Gastrointestinal:  Negative for nausea and vomiting.       Physical Exam not performed  E-visit questionnaire reviewed and discussed with patient.   All questions answered    Assessment/Plan   Problem List Items Addressed This Visit             ICD-10-CM    Cold sore - Primary B00.1    Relevant Medications    valACYclovir (Valtrex) 1 gram tablet     Discussed with patient   Verbalized understanding, Teach back utilized  Recommend follow up with PCP

## 2025-03-20 ENCOUNTER — OFFICE VISIT (OUTPATIENT)
Dept: URGENT CARE | Age: 40
End: 2025-03-20
Payer: COMMERCIAL

## 2025-03-20 VITALS
WEIGHT: 185 LBS | TEMPERATURE: 98.4 F | RESPIRATION RATE: 16 BRPM | BODY MASS INDEX: 32.26 KG/M2 | DIASTOLIC BLOOD PRESSURE: 79 MMHG | OXYGEN SATURATION: 97 % | SYSTOLIC BLOOD PRESSURE: 123 MMHG | HEART RATE: 88 BPM

## 2025-03-20 DIAGNOSIS — J40 BRONCHITIS: Primary | ICD-10-CM

## 2025-03-20 RX ORDER — PREDNISONE 20 MG/1
40 TABLET ORAL DAILY
Qty: 10 TABLET | Refills: 0 | Status: SHIPPED | OUTPATIENT
Start: 2025-03-20 | End: 2025-03-25

## 2025-03-20 RX ORDER — AZITHROMYCIN 250 MG/1
TABLET, FILM COATED ORAL
Qty: 6 TABLET | Refills: 0 | Status: SHIPPED | OUTPATIENT
Start: 2025-03-20 | End: 2025-03-25

## 2025-03-20 RX ORDER — PREDNISONE 20 MG/1
40 TABLET ORAL DAILY
Qty: 10 TABLET | Refills: 0 | Status: SHIPPED | OUTPATIENT
Start: 2025-03-20 | End: 2025-03-20

## 2025-03-20 RX ORDER — AZITHROMYCIN 250 MG/1
TABLET, FILM COATED ORAL
Qty: 6 TABLET | Refills: 0 | Status: SHIPPED | OUTPATIENT
Start: 2025-03-20 | End: 2025-03-20

## 2025-03-20 ASSESSMENT — ENCOUNTER SYMPTOMS: COUGH: 1

## 2025-03-20 NOTE — PROGRESS NOTES
Subjective   Patient ID: Raegan Ozuna is a 39 y.o. female. They present today with a chief complaint of Cough (C/o cough x 3 weeks+.  No other symptoms).    History of Present Illness  Patient is a 39-year-old female with no relevant past medical history presents urgent care today with a complaint of ongoing, worsening cough.  She states she had a viral upper respiratory infection approximately 3 weeks ago.  All of her symptoms have resolved except for her cough.  She notes the cough is primarily unproductive but it keeps her awake at night.  She has tried multiple over-the-counter medication without significant relief.  She denies any chest pain or shortness of breath.  No other complaints or concerns mention at this time.      History provided by:  Patient  Cough        Past Medical History  Allergies as of 03/20/2025    (No Known Allergies)       (Not in a hospital admission)         Past Medical History:   Diagnosis Date    Acute vaginitis 09/05/2015    Bacterial vaginosis    Autoimmune disorder (Multi)     Encounter for general adult medical examination without abnormal findings 01/08/2016    Health maintenance examination    Encounter for gynecological examination (general) (routine) without abnormal findings 01/08/2016    Visit for routine gyn exam    Encounter for gynecological examination (general) (routine) without abnormal findings 01/08/2016    Visit for routine gyn exam    Encounter for pregnancy test, result positive ([facility]) 03/25/2019    Positive pregnancy test    Encounter for pregnancy test, result positive ([facility]) 03/25/2019    Positive pregnancy test    Encounter for screening for malignant neoplasm of cervix 01/02/2015    Pap smear for cervical cancer screening    Encounter for supervision of normal pregnancy, unspecified, third trimester ([facility]) 04/30/2021    Third trimester pregnancy    Encounter for supervision of normal pregnancy, unspecified, third trimester ([facility]) 04/30/2021     Third trimester pregnancy    Encounter for supervision of normal pregnancy, unspecified, third trimester ([facility]) 2019    Pregnancy, obstetrical care, third trimester    Ganglion, right wrist 2016    Ganglion of right wrist    Incomplete spontaneous  without complication ([facility]) 2018    Incomplete     Other conditions influencing health status     Abnormal human chorionic gonadotropin (hCG)    Personal history of other complications of pregnancy, childbirth and the puerperium 09/10/2018    History of ectopic pregnancy    Personal history of other diseases of the female genital tract 2015    History of vaginal discharge    Personal history of other diseases of urinary system 2015    History of hematuria    Personal history of other mental and behavioral disorders 2016    History of depression    Personal history of other mental and behavioral disorders 2016    History of depression    Personal history of other mental and behavioral disorders 2016    History of depression    Personal history of other specified conditions 2016    History of insomnia    Supervision of pregnancy with history of ectopic pregnancy, first trimester ([facility]) 2019    Pregnancy with history of ectopic pregnancy, first trimester    Threatened  ([facility]) 2018    Threatened  in first trimester       Past Surgical History:   Procedure Laterality Date     SECTION, LOW TRANSVERSE      ELBOW FRACTURE SURGERY Left     OTHER SURGICAL HISTORY  2019     section low transverse    OTHER SURGICAL HISTORY  2016    History Of Prior Surgery    SALPINGECTOMY Right     TONSILLECTOMY  2016    Tonsillectomy    TONSILLECTOMY          reports that she has never smoked. She has never used smokeless tobacco. She reports that she does not currently use alcohol. She reports that she does not use drugs.    Review of Systems  Review of  Systems   Respiratory:  Positive for cough.                                   Objective    Vitals:    03/20/25 1522   BP: 123/79   Pulse: 88   Resp: 16   Temp: 36.9 °C (98.4 °F)   TempSrc: Oral   SpO2: 97%   Weight: 83.9 kg (185 lb)     No LMP recorded.    Physical Exam  Vitals and nursing note reviewed.   Constitutional:       General: She is not in acute distress.     Appearance: Normal appearance. She is not ill-appearing, toxic-appearing or diaphoretic.   HENT:      Head: Normocephalic and atraumatic.      Nose: Nose normal.      Mouth/Throat:      Mouth: Mucous membranes are moist.      Pharynx: Posterior oropharyngeal erythema present. No oropharyngeal exudate.   Eyes:      Extraocular Movements: Extraocular movements intact.      Conjunctiva/sclera: Conjunctivae normal.      Pupils: Pupils are equal, round, and reactive to light.   Cardiovascular:      Rate and Rhythm: Normal rate and regular rhythm.      Pulses: Normal pulses.      Heart sounds: Normal heart sounds.   Pulmonary:      Effort: Pulmonary effort is normal. No respiratory distress.      Breath sounds: Normal breath sounds. No stridor. No wheezing, rhonchi or rales.   Chest:      Chest wall: No tenderness.   Musculoskeletal:         General: Normal range of motion.      Cervical back: Normal range of motion and neck supple.   Skin:     General: Skin is warm and dry.      Capillary Refill: Capillary refill takes less than 2 seconds.   Neurological:      General: No focal deficit present.      Mental Status: She is alert and oriented to person, place, and time.   Psychiatric:         Mood and Affect: Mood normal.         Behavior: Behavior normal.         Procedures      Assessment/Plan   Allergies, medications, history, and pertinent labs/EKGs/Imaging reviewed by CAROL Jackson.     Medical Decision Making    Patient is well appearing, afebrile, non toxic, not hypoxic, and appropriate for outpatient treatment and management at time of  evaluation. Patient presents with ongoing, worsening cough.     Differential includes but not limited to: Bronchitis, pneumonia, URI, postnasal drip, other    On exam, lung sounds are clear in all fields bilaterally.  No wheeze or rhonchi appreciated.  Vitals within normal limits.  Oxygen saturation on room air is 97%.    History and exam consistent with bronchitis.  Patient states she has an albuterol inhaler at home which she can use.  She was provided with a short course of azithromycin and prednisone to use as directed.  She will follow-up with her PCP in 4 to 5 days if she is not feeling significantly better.  Red flags and ER precautions discussed.  Patient voices understanding and is agreeable to this plan.  She was discharged in stable condition.  All questions and concerns addressed.       Dictation software was used in the creation of this note which does not evaluate or correct for typographical, spelling, syntax or grammatical errors.    Orders and Diagnoses  Diagnoses and all orders for this visit:  Bronchitis  -     azithromycin (Zithromax) 250 mg tablet; Take 2 tabs (500 mg) by mouth today, than 1 daily for 4 days.  -     predniSONE (Deltasone) 20 mg tablet; Take 2 tablets (40 mg) by mouth once daily for 5 days.      Medical Admin Record      Follow Up Instructions  No follow-ups on file.    Patient disposition: Home    Electronically signed by CAROL Jackson  3:31 PM

## 2025-07-14 ENCOUNTER — APPOINTMENT (OUTPATIENT)
Dept: PRIMARY CARE | Facility: CLINIC | Age: 40
End: 2025-07-14
Payer: COMMERCIAL

## 2025-07-14 VITALS
OXYGEN SATURATION: 98 % | DIASTOLIC BLOOD PRESSURE: 77 MMHG | TEMPERATURE: 98 F | BODY MASS INDEX: 31.76 KG/M2 | WEIGHT: 186 LBS | HEART RATE: 61 BPM | HEIGHT: 64 IN | SYSTOLIC BLOOD PRESSURE: 121 MMHG

## 2025-07-14 DIAGNOSIS — Z13.29 SCREENING FOR THYROID DISORDER: ICD-10-CM

## 2025-07-14 DIAGNOSIS — Q65.89 ACETABULAR DYSPLASIA (HHS-HCC): ICD-10-CM

## 2025-07-14 DIAGNOSIS — E55.9 VITAMIN D INSUFFICIENCY: ICD-10-CM

## 2025-07-14 DIAGNOSIS — Z00.00 ANNUAL PHYSICAL EXAM: Primary | ICD-10-CM

## 2025-07-14 DIAGNOSIS — Z86.32 HISTORY OF GESTATIONAL DIABETES: ICD-10-CM

## 2025-07-14 PROBLEM — O09.93 SUPERVISION OF HIGH RISK PREGNANCY IN THIRD TRIMESTER (HHS-HCC): Status: RESOLVED | Noted: 2023-10-16 | Resolved: 2025-07-14

## 2025-07-14 PROBLEM — O36.63X0 EXCESSIVE FETAL GROWTH AFFECTING MANAGEMENT OF PREGNANCY IN THIRD TRIMESTER (HHS-HCC): Status: RESOLVED | Noted: 2023-10-16 | Resolved: 2025-07-14

## 2025-07-14 PROBLEM — O36.80X0 PREGNANCY WITH UNCERTAIN FETAL VIABILITY (HHS-HCC): Status: RESOLVED | Noted: 2024-07-12 | Resolved: 2025-07-14

## 2025-07-14 PROBLEM — O36.60X0 EXCESSIVE FETAL GROWTH AFFECTING MANAGEMENT OF MOTHER, ANTEPARTUM (HHS-HCC): Status: RESOLVED | Noted: 2023-11-15 | Resolved: 2025-07-14

## 2025-07-14 PROBLEM — Z3A.38 38 WEEKS GESTATION OF PREGNANCY (HHS-HCC): Status: RESOLVED | Noted: 2023-12-06 | Resolved: 2025-07-14

## 2025-07-14 PROBLEM — O99.210 OBESITY IN PREGNANCY (HHS-HCC): Status: RESOLVED | Noted: 2023-10-15 | Resolved: 2025-07-14

## 2025-07-14 PROBLEM — O09.529 MULTIGRAVIDA OF ADVANCED MATERNAL AGE (HHS-HCC): Status: RESOLVED | Noted: 2023-11-27 | Resolved: 2025-07-14

## 2025-07-14 PROCEDURE — 3008F BODY MASS INDEX DOCD: CPT | Performed by: FAMILY MEDICINE

## 2025-07-14 PROCEDURE — 99395 PREV VISIT EST AGE 18-39: CPT | Performed by: FAMILY MEDICINE

## 2025-07-14 PROCEDURE — 3078F DIAST BP <80 MM HG: CPT | Performed by: FAMILY MEDICINE

## 2025-07-14 PROCEDURE — 3074F SYST BP LT 130 MM HG: CPT | Performed by: FAMILY MEDICINE

## 2025-07-14 PROCEDURE — 99213 OFFICE O/P EST LOW 20 MIN: CPT | Performed by: FAMILY MEDICINE

## 2025-07-14 PROCEDURE — 1036F TOBACCO NON-USER: CPT | Performed by: FAMILY MEDICINE

## 2025-07-14 ASSESSMENT — ANXIETY QUESTIONNAIRES
7. FEELING AFRAID AS IF SOMETHING AWFUL MIGHT HAPPEN: NOT AT ALL
GAD7 TOTAL SCORE: 8
4. TROUBLE RELAXING: MORE THAN HALF THE DAYS
IF YOU CHECKED OFF ANY PROBLEMS ON THIS QUESTIONNAIRE, HOW DIFFICULT HAVE THESE PROBLEMS MADE IT FOR YOU TO DO YOUR WORK, TAKE CARE OF THINGS AT HOME, OR GET ALONG WITH OTHER PEOPLE: SOMEWHAT DIFFICULT
2. NOT BEING ABLE TO STOP OR CONTROL WORRYING: NOT AT ALL
6. BECOMING EASILY ANNOYED OR IRRITABLE: MORE THAN HALF THE DAYS
1. FEELING NERVOUS, ANXIOUS, OR ON EDGE: SEVERAL DAYS
3. WORRYING TOO MUCH ABOUT DIFFERENT THINGS: SEVERAL DAYS
5. BEING SO RESTLESS THAT IT IS HARD TO SIT STILL: MORE THAN HALF THE DAYS

## 2025-07-14 ASSESSMENT — PROMIS GLOBAL HEALTH SCALE
RATE_SOCIAL_SATISFACTION: VERY GOOD
EMOTIONAL_PROBLEMS: SOMETIMES
RATE_QUALITY_OF_LIFE: VERY GOOD
RATE_GENERAL_HEALTH: GOOD
RATE_AVERAGE_PAIN: 0
CARRYOUT_SOCIAL_ACTIVITIES: VERY GOOD
RATE_AVERAGE_FATIGUE: MODERATE
RATE_MENTAL_HEALTH: GOOD
CARRYOUT_PHYSICAL_ACTIVITIES: COMPLETELY
RATE_PHYSICAL_HEALTH: GOOD

## 2025-07-14 ASSESSMENT — PATIENT HEALTH QUESTIONNAIRE - PHQ9
3. TROUBLE FALLING OR STAYING ASLEEP OR SLEEPING TOO MUCH: SEVERAL DAYS
7. TROUBLE CONCENTRATING ON THINGS, SUCH AS READING THE NEWSPAPER OR WATCHING TELEVISION: MORE THAN HALF THE DAYS
5. POOR APPETITE OR OVEREATING: NOT AT ALL
9. THOUGHTS THAT YOU WOULD BE BETTER OFF DEAD, OR OF HURTING YOURSELF: NOT AT ALL
4. FEELING TIRED OR HAVING LITTLE ENERGY: SEVERAL DAYS
2. FEELING DOWN, DEPRESSED OR HOPELESS: NOT AT ALL
SUM OF ALL RESPONSES TO PHQ9 QUESTIONS 1 AND 2: 0
1. LITTLE INTEREST OR PLEASURE IN DOING THINGS: NOT AT ALL
8. MOVING OR SPEAKING SO SLOWLY THAT OTHER PEOPLE COULD HAVE NOTICED. OR THE OPPOSITE, BEING SO FIGETY OR RESTLESS THAT YOU HAVE BEEN MOVING AROUND A LOT MORE THAN USUAL: MORE THAN HALF THE DAYS
SUM OF ALL RESPONSES TO PHQ QUESTIONS 1-9: 6
6. FEELING BAD ABOUT YOURSELF - OR THAT YOU ARE A FAILURE OR HAVE LET YOURSELF OR YOUR FAMILY DOWN: NOT AT ALL

## 2025-07-14 ASSESSMENT — PAIN SCALES - GENERAL: PAINLEVEL_OUTOF10: 0-NO PAIN

## 2025-07-14 NOTE — PROGRESS NOTES
"Subjective   Patient ID: Raegan Ozuna is a 39 y.o. female who presents for Annual Exam.    HPI   Patient is in the office today for a follow-up and cpe.  She is overdue for new lab tests, she is fasting today, and will perform it. Reviewed old labs, in late  was anemic, she delivered her 3rd child via C/S at that time and blood count recovered by 2024    Her BP (121/77) was within normal limits when checked today at the office. She denies SOB or chest pain.     She has personal history of vitamin D deficiency. She is not taking any vitamin D OTC. Her last vitamin D from 7/15/2024 was at 34.    She has personal history of gestational diabetes which was treated with diet only. She denies family history of diabetes mellitus. Her last A1C from 7/15/2024 was at 5.0%.  she would like repeated this year    She reports  site of her , she feels that her skin is not symmetrical in that zone and higher, she states one side protrudes more.  She has never felt a mass.  Sometimes some mild tenderness or pain down by c/s scar on L PE, no hernia palpable.    She sees gyn, 3 years since last pap, will schedule.  She had an abnormal in HS and no abnormals since,  she is having regular periods now.  Breast fed for 3 months after delivery    Review of Systems    Objective   /77 (BP Location: Left arm, Patient Position: Sitting, BP Cuff Size: Adult)   Pulse 61   Temp 36.7 °C (98 °F) (Temporal)   Ht 1.626 m (5' 4\")   Wt 84.4 kg (186 lb)   SpO2 98%   BMI 31.93 kg/m²     Physical Exam  Constitutional:       Appearance: Normal appearance.   HENT:      Head: Normocephalic.      Right Ear: Tympanic membrane normal.      Left Ear: Tympanic membrane normal.      Mouth/Throat:      Pharynx: Oropharynx is clear.   Cardiovascular:      Rate and Rhythm: Normal rate and regular rhythm.      Pulses: Normal pulses.      Heart sounds: Normal heart sounds.   Pulmonary:      Effort: Pulmonary effort is normal.      Breath " sounds: Normal breath sounds.   Abdominal:      General: Bowel sounds are normal.      Palpations: Abdomen is soft. There is no mass.      Tenderness: There is no abdominal tenderness.      Hernia: No hernia is present.   Musculoskeletal:         General: Normal range of motion.      Cervical back: Normal range of motion and neck supple.      Right lower leg: No edema.      Left lower leg: No edema.   Lymphadenopathy:      Cervical: No cervical adenopathy.   Skin:     General: Skin is warm and dry.      Findings: No rash.   Neurological:      General: No focal deficit present.      Mental Status: She is alert and oriented to person, place, and time.   Psychiatric:         Mood and Affect: Mood normal.         Thought Content: Thought content normal.         Judgment: Judgment normal.         Assessment/Plan   Diagnoses and all orders for this visit:  Annual physical exam  -     CBC; Future  -     Lipid Panel; Future  -     Comprehensive Metabolic Panel; Future  -     TSH with reflex to Free T4 if abnormal; Future  Screening for thyroid disorder  -     TSH with reflex to Free T4 if abnormal; Future  Vitamin D insufficiency  -     Vitamin D 25-Hydroxy,Total (for eval of Vitamin D levels); Future  History of gestational diabetes  -     Hemoglobin A1C; Future    Recommended lab tests today as she is fasting, we will call her to discuss lab results.  Recommended schedule PAP smear with her OB GYN.  Discussed diet and exercise to help her reach her goals  Follow-up in 12 months, call us if needed sooner.     Patient Health Questionnaire-9 Score: 6     Raegan has previously been treated or referred to behavioral health/counseling. No additional follow-up is currently indicated.     Scribe Attestation  By signing my name below, Ta WOODS Scribe   attest that this documentation has been prepared under the direction and in the presence of Cat Reddy DO.

## 2025-07-15 LAB
25(OH)D3+25(OH)D2 SERPL-MCNC: 31 NG/ML (ref 30–100)
ALBUMIN SERPL-MCNC: 4.7 G/DL (ref 3.6–5.1)
ALP SERPL-CCNC: 58 U/L (ref 31–125)
ALT SERPL-CCNC: 15 U/L (ref 6–29)
ANION GAP SERPL CALCULATED.4IONS-SCNC: 11 MMOL/L (CALC) (ref 7–17)
AST SERPL-CCNC: 18 U/L (ref 10–30)
BILIRUB SERPL-MCNC: 0.7 MG/DL (ref 0.2–1.2)
BUN SERPL-MCNC: 13 MG/DL (ref 7–25)
CALCIUM SERPL-MCNC: 9.2 MG/DL (ref 8.6–10.2)
CHLORIDE SERPL-SCNC: 104 MMOL/L (ref 98–110)
CHOLEST SERPL-MCNC: 134 MG/DL
CHOLEST/HDLC SERPL: 2.1 (CALC)
CO2 SERPL-SCNC: 23 MMOL/L (ref 20–32)
CREAT SERPL-MCNC: 0.68 MG/DL (ref 0.5–0.97)
EGFRCR SERPLBLD CKD-EPI 2021: 115 ML/MIN/1.73M2
ERYTHROCYTE [DISTWIDTH] IN BLOOD BY AUTOMATED COUNT: 12.7 % (ref 11–15)
EST. AVERAGE GLUCOSE BLD GHB EST-MCNC: 103 MG/DL
EST. AVERAGE GLUCOSE BLD GHB EST-SCNC: 5.7 MMOL/L
GLUCOSE SERPL-MCNC: 92 MG/DL (ref 65–99)
HBA1C MFR BLD: 5.2 %
HCT VFR BLD AUTO: 44.6 % (ref 35–45)
HDLC SERPL-MCNC: 63 MG/DL
HGB BLD-MCNC: 14.5 G/DL (ref 11.7–15.5)
LDLC SERPL CALC-MCNC: 55 MG/DL (CALC)
MCH RBC QN AUTO: 29.5 PG (ref 27–33)
MCHC RBC AUTO-ENTMCNC: 32.5 G/DL (ref 32–36)
MCV RBC AUTO: 90.8 FL (ref 80–100)
NONHDLC SERPL-MCNC: 71 MG/DL (CALC)
PLATELET # BLD AUTO: 216 THOUSAND/UL (ref 140–400)
PMV BLD REES-ECKER: 9.9 FL (ref 7.5–12.5)
POTASSIUM SERPL-SCNC: 4.1 MMOL/L (ref 3.5–5.3)
PROT SERPL-MCNC: 7 G/DL (ref 6.1–8.1)
RBC # BLD AUTO: 4.91 MILLION/UL (ref 3.8–5.1)
SODIUM SERPL-SCNC: 138 MMOL/L (ref 135–146)
TRIGL SERPL-MCNC: 77 MG/DL
TSH SERPL-ACNC: 1.7 MIU/L
WBC # BLD AUTO: 5.4 THOUSAND/UL (ref 3.8–10.8)

## 2025-07-23 ENCOUNTER — APPOINTMENT (OUTPATIENT)
Dept: DERMATOLOGY | Facility: CLINIC | Age: 40
End: 2025-07-23
Payer: COMMERCIAL

## 2025-07-23 DIAGNOSIS — D22.9 MELANOCYTIC NEVUS, UNSPECIFIED LOCATION: Primary | ICD-10-CM

## 2025-07-23 DIAGNOSIS — Z12.83 ENCOUNTER FOR SCREENING FOR MALIGNANT NEOPLASM OF SKIN: ICD-10-CM

## 2025-07-23 DIAGNOSIS — D18.01 HEMANGIOMA OF SKIN: ICD-10-CM

## 2025-07-23 PROCEDURE — 99213 OFFICE O/P EST LOW 20 MIN: CPT | Performed by: NURSE PRACTITIONER

## 2025-07-23 PROCEDURE — 1036F TOBACCO NON-USER: CPT | Performed by: NURSE PRACTITIONER

## 2025-07-23 ASSESSMENT — DERMATOLOGY QUALITY OF LIFE (QOL) ASSESSMENT
WHAT SINGLE SKIN CONDITION LISTED BELOW IS THE PATIENT ANSWERING THE QUALITY-OF-LIFE ASSESSMENT QUESTIONS ABOUT: NONE OF THE ABOVE
RATE HOW EMOTIONALLY BOTHERED YOU ARE BY YOUR SKIN PROBLEM (FOR EXAMPLE, WORRY, EMBARRASSMENT, FRUSTRATION): 1
WHAT SINGLE SKIN CONDITION LISTED BELOW IS THE PATIENT ANSWERING THE QUALITY-OF-LIFE ASSESSMENT QUESTIONS ABOUT: NONE OF THE ABOVE
RATE HOW BOTHERED YOU ARE BY SYMPTOMS OF YOUR SKIN PROBLEM (EG, ITCHING, STINGING BURNING, HURTING OR SKIN IRRITATION): 3
RATE HOW BOTHERED YOU ARE BY SYMPTOMS OF YOUR SKIN PROBLEM (EG, ITCHING, STINGING BURNING, HURTING OR SKIN IRRITATION): 3
RATE HOW EMOTIONALLY BOTHERED YOU ARE BY YOUR SKIN PROBLEM (FOR EXAMPLE, WORRY, EMBARRASSMENT, FRUSTRATION): 1
RATE HOW BOTHERED YOU ARE BY EFFECTS OF YOUR SKIN PROBLEMS ON YOUR ACTIVITIES (EG, GOING OUT, ACCOMPLISHING WHAT YOU WANT, WORK ACTIVITIES OR YOUR RELATIONSHIPS WITH OTHERS): 0 - NEVER BOTHERED
RATE HOW BOTHERED YOU ARE BY EFFECTS OF YOUR SKIN PROBLEMS ON YOUR ACTIVITIES (EG, GOING OUT, ACCOMPLISHING WHAT YOU WANT, WORK ACTIVITIES OR YOUR RELATIONSHIPS WITH OTHERS): 0 - NEVER BOTHERED

## 2025-07-23 ASSESSMENT — PATIENT GLOBAL ASSESSMENT (PGA): WHAT IS THE PGA: PATIENT GLOBAL ASSESSMENT:  1 - CLEAR

## 2025-07-23 NOTE — Clinical Note
- A cherry hemangioma is a small macule (small, flat, smooth area) or papule (small, solid bump) formed from an overgrowth of tiny blood vessels in the skin. Cherry hemangiomas are characteristically red or purplish in color. They often first appear in middle adulthood and usually increase in number with age. Cherry hemangiomas are noncancerous (benign) and are common in adults.  - Lesions are benign, reassured patient.     Follow up in 12 months. Please call me if there are any changes or development of concerning symptoms (lesion/skin condition is changing, bleeding, enlarging, or worsening).

## 2025-07-23 NOTE — PROGRESS NOTES
Subjective     Raegan Ozuna is a 39 y.o. female who presents for the following: Skin Check (Presents for FSE: no history of skin cancer. ).     Intake Questions  Do you have any new or changing Lesions?: (Patient-Rptd) (P) Yes  Where are these new or changing lesion(s) located?: (Patient-Rptd) (P) Left nose  Are you an organ transplant recipient?: (Patient-Rptd) (P) No  Have you had or do you have a Staph Infection?: (Patient-Rptd) (P) No  Have you had or do you have Vacular Disease?: (Patient-Rptd) (P) No  Do you use sunscreen?: (Patient-Rptd) (P) Occasionally  Do you use a tanning bed?: (Patient-Rptd) (P) Yes, Previously  Are you trying to get pregnant?: (Patient-Rptd) (P) No  Are you on birth control?: (Patient-Rptd) (P) No  Do you have irregular menstrual cycles?: (Patient-Rptd) (P) No    Review of Systems:  No other skin or systemic complaints other than what is documented elsewhere in the note.    The following portions of the chart were reviewed this encounter and updated as appropriate:         Skin Cancer History  Biopsy Log Book  No skin cancers from Specimen Tracking.    Additional History      Specialty Problems          Dermatology Problems    Dermatitis, contact    Multiple nevi     Past Medical History:  Raegan Ozuna  has a past medical history of Acute vaginitis (09/05/2015), Autoimmune disorder (Multi), Encounter for general adult medical examination without abnormal findings (01/08/2016), Encounter for gynecological examination (general) (routine) without abnormal findings (01/08/2016), Encounter for gynecological examination (general) (routine) without abnormal findings (01/08/2016), Encounter for pregnancy test, result positive ([facility]) (03/25/2019), Encounter for pregnancy test, result positive ([facility]) (03/25/2019), Encounter for screening for malignant neoplasm of cervix (01/02/2015), Encounter for supervision of normal pregnancy, unspecified, third trimester (04/30/2021), Encounter for  supervision of normal pregnancy, unspecified, third trimester (2021), Encounter for supervision of normal pregnancy, unspecified, third trimester (2019), Ganglion, right wrist (2016), Incomplete spontaneous  without complication (2018), Other conditions influencing health status, Personal history of other complications of pregnancy, childbirth and the puerperium (09/10/2018), Personal history of other diseases of the female genital tract (2015), Personal history of other diseases of urinary system (2015), Personal history of other mental and behavioral disorders (2016), Personal history of other mental and behavioral disorders (2016), Personal history of other mental and behavioral disorders (2016), Personal history of other specified conditions (2016), Psoriasis (during pregnancy), Supervision of pregnancy with history of ectopic pregnancy, first trimester ([facility]) (2019), and Threatened  ([facility]) (2018).    Past Surgical History:  Raegan Ozuna  has a past surgical history that includes Other surgical history (2019); Tonsillectomy (2016); Other surgical history (2016); Elbow fracture surgery (Left);  section, low transverse (, , ); Salpingectomy (Right); Tonsillectomy (); Skin biopsy (); and Adenoidectomy ().    Family History:  Patient family history includes Atrial fibrillation (age of onset: 70 - 79) in her maternal grandmother; Basal cell carcinoma in her paternal grandfather; Basal cell carcinoma (age of onset: 40 - 49) in her sister; Cancer (age of onset: 40 - 49) in her father; Cancer (age of onset: 60 - 69) in her maternal grandfather; Hypertension in her maternal grandmother; Squamous cell carcinoma in her paternal grandfather; bladder cancer in her father.    Social History:  Raegan Ozuna  reports that she has never smoked. She has never used smokeless tobacco. She  reports that she does not currently use alcohol. She reports that she does not use drugs.    Allergies:  Patient has no known allergies.    Current Medications / CAM's:  Current Medications[1]     Objective   Well appearing patient in no apparent distress; mood and affect are within normal limits.    A full examination was performed including scalp, head, eyes, ears, nose, lips, neck, chest, axillae, abdomen, back, buttocks, bilateral upper extremities, bilateral lower extremities, hands, feet, fingers, toes, fingernails, and toenails. All findings within normal limits unless otherwise noted below.    Assessment/Plan   Skin Exam  1. ENCOUNTER FOR SCREENING FOR MALIGNANT NEOPLASM OF SKIN  Generalized  Scattered benign lesions  - Protective measures, such as avoiding skin exposure to sunlight during peak sun hours (10 AM to 3 PM), wearing protective clothing, and applying high-SPF sunscreen, are essential for reducing exposure to harmful ultraviolet (UV) light.  - Monthly self-examination of the skin is helpful to detect new lesions or changes in existing lesions.  - Discussed signs and symptoms of sun-related skin cancers.   - Make sure your moles are not signs of skin cancer (melanoma). Remember the ABCDEs of melanoma lesions:  A - Asymmetry: One half of the lesion does not mirror the other half.  B - Border: The borders are irregular or vague (indistinct).  C - Color: More than one color may be noted within the mole.  D - Diameter: Size greater than 6 mm (roughly the size of a pencil eraser) may be concerning.  E - Evolving: Notable changes in the lesion over time are suspicious signs for skin cancer.  2. MELANOCYTIC NEVUS, UNSPECIFIED LOCATION  Generalized  Uniform pigmented macule(s)/papule(s) with reassuring findings on dermoscopy  -Discussed nature of condition  -Reassurance, benign-appearing features on examination today  -Recommend continued observation  3. HEMANGIOMA OF SKIN  Generalized  Violaceous/red  papule with maroon lagoons   - A cherry hemangioma is a small macule (small, flat, smooth area) or papule (small, solid bump) formed from an overgrowth of tiny blood vessels in the skin. Cherry hemangiomas are characteristically red or purplish in color. They often first appear in middle adulthood and usually increase in number with age. Cherry hemangiomas are noncancerous (benign) and are common in adults.  - Lesions are benign, reassured patient.     Follow up in 12 months. Please call me if there are any changes or development of concerning symptoms (lesion/skin condition is changing, bleeding, enlarging, or worsening).       Follow up in 6 months. Please call me if there are any changes or development of concerning symptoms (lesion/skin condition is changing, bleeding, enlarging, or worsening).         [1] No current outpatient medications on file.

## 2025-08-07 ENCOUNTER — APPOINTMENT (OUTPATIENT)
Dept: OBSTETRICS AND GYNECOLOGY | Facility: CLINIC | Age: 40
End: 2025-08-07
Payer: COMMERCIAL

## 2025-08-07 VITALS
WEIGHT: 188 LBS | DIASTOLIC BLOOD PRESSURE: 80 MMHG | HEIGHT: 64 IN | SYSTOLIC BLOOD PRESSURE: 121 MMHG | BODY MASS INDEX: 32.1 KG/M2

## 2025-08-07 DIAGNOSIS — Z01.419 ENCOUNTER FOR ANNUAL ROUTINE GYNECOLOGICAL EXAMINATION: Primary | ICD-10-CM

## 2025-08-07 LAB
POC BLOOD, URINE: NEGATIVE
POC GLUCOSE, URINE: NEGATIVE MG/DL
POC KETONES, URINE: NEGATIVE MG/DL
POC LEUKOCYTES, URINE: NEGATIVE
POC NITRITE,URINE: NEGATIVE
POC PROTEIN, URINE: NEGATIVE MG/DL

## 2025-08-07 PROCEDURE — 81003 URINALYSIS AUTO W/O SCOPE: CPT | Performed by: OBSTETRICS & GYNECOLOGY

## 2025-08-07 PROCEDURE — 3074F SYST BP LT 130 MM HG: CPT | Performed by: OBSTETRICS & GYNECOLOGY

## 2025-08-07 PROCEDURE — 88175 CYTOPATH C/V AUTO FLUID REDO: CPT

## 2025-08-07 PROCEDURE — 99395 PREV VISIT EST AGE 18-39: CPT | Performed by: OBSTETRICS & GYNECOLOGY

## 2025-08-07 PROCEDURE — 87626 HPV SEP HI-RSK TYP&POOL RSLT: CPT

## 2025-08-07 PROCEDURE — 3008F BODY MASS INDEX DOCD: CPT | Performed by: OBSTETRICS & GYNECOLOGY

## 2025-08-07 PROCEDURE — 3079F DIAST BP 80-89 MM HG: CPT | Performed by: OBSTETRICS & GYNECOLOGY

## 2025-08-07 PROCEDURE — 1036F TOBACCO NON-USER: CPT | Performed by: OBSTETRICS & GYNECOLOGY

## 2025-08-07 ASSESSMENT — ENCOUNTER SYMPTOMS
ENDOCRINE NEGATIVE: 0
LOSS OF SENSATION IN FEET: 0
HEMATOLOGIC/LYMPHATIC NEGATIVE: 0
MUSCULOSKELETAL NEGATIVE: 0
ALLERGIC/IMMUNOLOGIC NEGATIVE: 0
GASTROINTESTINAL NEGATIVE: 0
RESPIRATORY NEGATIVE: 0
CARDIOVASCULAR NEGATIVE: 0
OCCASIONAL FEELINGS OF UNSTEADINESS: 0
PSYCHIATRIC NEGATIVE: 0
CONSTITUTIONAL NEGATIVE: 0
NEUROLOGICAL NEGATIVE: 0
DEPRESSION: 0
EYES NEGATIVE: 0

## 2025-08-07 ASSESSMENT — PAIN SCALES - GENERAL: PAINLEVEL_OUTOF10: 0-NO PAIN

## 2025-08-07 NOTE — PROGRESS NOTES
Subjective   Patient ID: Raegan Ozuna is a 39 y.o. female who presents for Annual Exam (Patient has no pain or falls, no complaints or concerns, last pap 2022 no chaperone needed).  HPI    Pt is  female presenting for annual exam. All deliveries were caesarian section. Menstrual cycles are regular with LMP being 25.  Pt is not using contraception and is not interested in starting any. She is due for pap today.      Review of Systems  Negative    Objective   Physical Exam  General- skin warm and dry  Cardio- no extra heart sounds appreciated.  Rhythm regular.  Pulm- lungs clear bilaterally with no rhonci wheezes, or rales.  Abdomen- soft and non tender.  Scar tissue noted on the L side of caesarian section scar, but otherwise well healed.  Breast- Breasts were without dimpling or skin changes.  No nodules appreciated.  Pelvic- external genitalia without abnormalities.  Uterus was small, non-tender, and mobile. No adnexal masses appreciated.    Assessment/Plan   Pt is a healthy 38yo who is currently up to date on all gyn screening guidelines.  Pap smear performed today in office.    Desires no contraception currently.  Is contemplating a fourth pregnancy which would be a fourth  delivery.  She had a difficult experience with an nonfunctioning spinal/epidural and had to go under general anesthesia which was disappointing.  Operative report reviewed and no indication of significant scar tissue or difficulty with the procedure.    -return in one year for annual exam       KIARA KANG 25 9:45 AM

## 2026-07-29 ENCOUNTER — APPOINTMENT (OUTPATIENT)
Dept: DERMATOLOGY | Facility: CLINIC | Age: 41
End: 2026-07-29
Payer: COMMERCIAL

## (undated) DEVICE — TIP, SUCTION, YANKAUER, BULB, ADULT

## (undated) DEVICE — DRAPE, SHEET, THREE QUARTER, FAN FOLD, 57 X 77 IN

## (undated) DEVICE — TUBING, SUCTION, NON-CONDUCTIVE,W/MALE CONNECT, 6MM X 12 FT, STERILE

## (undated) DEVICE — SUTURE, MONOCRYL, 4-0, 27 IN, PS-2, UNDYED

## (undated) DEVICE — SOLUTION, IRRIGATION, SODIUM CHLORIDE 0.9%, 1000 ML, POUR BOTTLE

## (undated) DEVICE — BLADE, ARTHRO-LOK, MINI-MENISCUS, FLAT, 4 MM

## (undated) DEVICE — Device

## (undated) DEVICE — TOWEL PACK, STERILE, 4/PACK, BLUE

## (undated) DEVICE — KIT, MINOR, DOUBLE BASIN

## (undated) DEVICE — TOWEL, SURGICAL, NEURO, O/R, 16 X 26, BLUE, STERILE

## (undated) DEVICE — GOWN, SURGICAL, SMARTGOWN, XLARGE, STERILE

## (undated) DEVICE — SUTURE, VICRYL, 0, 36 IN, CT, UNDYED

## (undated) DEVICE — SPONGE, LAP, XRAY DECT, 18IN X 18IN, W/MASTER DMT, STERILE

## (undated) DEVICE — PREP TRAY, VAGINAL

## (undated) DEVICE — SUTURE, MONOCRYLIC, 4-0, P3, MONO 18

## (undated) DEVICE — APPLICATOR, CHLORAPREP, W/ORANGE TINT, 26ML

## (undated) DEVICE — CORD, CAUTERY, BIOPOLAR FORCEP, 12FT

## (undated) DEVICE — COVER HANDLE LIGHT, STERIS, BLUE, STERILE

## (undated) DEVICE — NEEDLE, HYPODERMIC, REGULAR WALL, REGULAR BEVEL, 25 G X 5/8 IN

## (undated) DEVICE — GLOVE, SURGICAL, PROTEXIS PI MICRO, 7.5, PF, LF

## (undated) DEVICE — SUTURE, SURGILON, 4-0, 18, WHITE, P-12      "

## (undated) DEVICE — BOWL, BASIN, 32 OZ, STERILE

## (undated) DEVICE — GLOVE, SURGICAL, PROTEXIS PI BLUE W/NEUTHERA, 8.0, PF, LF